# Patient Record
Sex: FEMALE | Race: WHITE | NOT HISPANIC OR LATINO
[De-identification: names, ages, dates, MRNs, and addresses within clinical notes are randomized per-mention and may not be internally consistent; named-entity substitution may affect disease eponyms.]

---

## 2017-04-03 ENCOUNTER — RESULT REVIEW (OUTPATIENT)
Age: 37
End: 2017-04-03

## 2017-04-03 DIAGNOSIS — Z33.2 ENCOUNTER FOR ELECTIVE TERMINATION OF PREGNANCY: ICD-10-CM

## 2017-12-10 ENCOUNTER — INPATIENT (INPATIENT)
Facility: HOSPITAL | Age: 37
LOS: 1 days | Discharge: HOME | End: 2017-12-12
Attending: OBSTETRICS & GYNECOLOGY | Admitting: OBSTETRICS & GYNECOLOGY
Payer: COMMERCIAL

## 2017-12-10 DIAGNOSIS — O26.899 OTHER SPECIFIED PREGNANCY RELATED CONDITIONS, UNSPECIFIED TRIMESTER: ICD-10-CM

## 2017-12-10 DIAGNOSIS — Z98.89 OTHER SPECIFIED POSTPROCEDURAL STATES: Chronic | ICD-10-CM

## 2017-12-10 DIAGNOSIS — Z3A.00 WEEKS OF GESTATION OF PREGNANCY NOT SPECIFIED: ICD-10-CM

## 2017-12-10 LAB
AMNISURE ROM (RUPTURE OF MEMBRANES): POSITIVE — SIGNIFICANT CHANGE UP
BASOPHILS # BLD AUTO: 0.05 K/UL — SIGNIFICANT CHANGE UP (ref 0–0.2)
BASOPHILS NFR BLD AUTO: 0.4 % — SIGNIFICANT CHANGE UP (ref 0–2)
BLD GP AB SCN SERPL QL: NEGATIVE — SIGNIFICANT CHANGE UP
EOSINOPHIL # BLD AUTO: 0.01 K/UL — SIGNIFICANT CHANGE UP (ref 0–0.5)
EOSINOPHIL NFR BLD AUTO: 0.1 % — SIGNIFICANT CHANGE UP (ref 0–6)
HCT VFR BLD CALC: 34.9 % — SIGNIFICANT CHANGE UP (ref 34.5–45)
HGB BLD-MCNC: 12 G/DL — SIGNIFICANT CHANGE UP (ref 11.5–15.5)
IMM GRANULOCYTES # BLD AUTO: 0.09 # — SIGNIFICANT CHANGE UP
IMM GRANULOCYTES NFR BLD AUTO: 0.7 % — SIGNIFICANT CHANGE UP (ref 0–1.5)
LYMPHOCYTES # BLD AUTO: 1.51 K/UL — SIGNIFICANT CHANGE UP (ref 1–3.3)
LYMPHOCYTES # BLD AUTO: 12.4 % — LOW (ref 13–44)
MCHC RBC-ENTMCNC: 31.8 PG — SIGNIFICANT CHANGE UP (ref 27–34)
MCHC RBC-ENTMCNC: 34.4 % — SIGNIFICANT CHANGE UP (ref 32–36)
MCV RBC AUTO: 92.6 FL — SIGNIFICANT CHANGE UP (ref 80–100)
MONOCYTES # BLD AUTO: 0.38 K/UL — SIGNIFICANT CHANGE UP (ref 0–0.9)
MONOCYTES NFR BLD AUTO: 3.1 % — SIGNIFICANT CHANGE UP (ref 2–14)
NEUTROPHILS # BLD AUTO: 10.16 K/UL — HIGH (ref 1.8–7.4)
NEUTROPHILS NFR BLD AUTO: 83.3 % — HIGH (ref 43–77)
NRBC # FLD: 0 — SIGNIFICANT CHANGE UP
PLATELET # BLD AUTO: 231 K/UL — SIGNIFICANT CHANGE UP (ref 150–400)
PMV BLD: 10.6 FL — SIGNIFICANT CHANGE UP (ref 7–13)
RBC # BLD: 3.77 M/UL — LOW (ref 3.8–5.2)
RBC # FLD: 13.7 % — SIGNIFICANT CHANGE UP (ref 10.3–14.5)
RH IG SCN BLD-IMP: POSITIVE — SIGNIFICANT CHANGE UP
WBC # BLD: 12.2 K/UL — HIGH (ref 3.8–10.5)
WBC # FLD AUTO: 12.2 K/UL — HIGH (ref 3.8–10.5)

## 2017-12-10 PROCEDURE — 99255 IP/OBS CONSLTJ NEW/EST HI 80: CPT | Mod: GC

## 2017-12-10 RX ORDER — LEVOTHYROXINE SODIUM 125 MCG
75 TABLET ORAL DAILY
Refills: 0 | Status: DISCONTINUED | OUTPATIENT
Start: 2017-12-10 | End: 2017-12-12

## 2017-12-10 RX ORDER — SODIUM CHLORIDE 9 MG/ML
1000 INJECTION, SOLUTION INTRAVENOUS
Refills: 0 | Status: DISCONTINUED | OUTPATIENT
Start: 2017-12-10 | End: 2017-12-12

## 2017-12-10 RX ORDER — ASPIRIN/CALCIUM CARB/MAGNESIUM 324 MG
81 TABLET ORAL DAILY
Refills: 0 | Status: DISCONTINUED | OUTPATIENT
Start: 2017-12-10 | End: 2017-12-10

## 2017-12-10 RX ORDER — MORPHINE SULFATE 50 MG/1
4 CAPSULE, EXTENDED RELEASE ORAL ONCE
Refills: 0 | Status: DISCONTINUED | OUTPATIENT
Start: 2017-12-10 | End: 2017-12-10

## 2017-12-10 RX ORDER — SODIUM CHLORIDE 9 MG/ML
3 INJECTION INTRAMUSCULAR; INTRAVENOUS; SUBCUTANEOUS EVERY 8 HOURS
Refills: 0 | Status: DISCONTINUED | OUTPATIENT
Start: 2017-12-10 | End: 2017-12-12

## 2017-12-10 RX ADMIN — MORPHINE SULFATE 4 MILLIGRAM(S): 50 CAPSULE, EXTENDED RELEASE ORAL at 14:25

## 2017-12-10 RX ADMIN — SODIUM CHLORIDE 125 MILLILITER(S): 9 INJECTION, SOLUTION INTRAVENOUS at 19:33

## 2017-12-11 VITALS — TEMPERATURE: 99 F | SYSTOLIC BLOOD PRESSURE: 127 MMHG | DIASTOLIC BLOOD PRESSURE: 68 MMHG

## 2017-12-11 LAB
APPEARANCE UR: CLEAR — SIGNIFICANT CHANGE UP
BILIRUB UR-MCNC: NEGATIVE — SIGNIFICANT CHANGE UP
BLOOD UR QL VISUAL: NEGATIVE — SIGNIFICANT CHANGE UP
COLOR SPEC: SIGNIFICANT CHANGE UP
GLUCOSE UR-MCNC: NEGATIVE — SIGNIFICANT CHANGE UP
KETONES UR-MCNC: NEGATIVE — SIGNIFICANT CHANGE UP
LEUKOCYTE ESTERASE UR-ACNC: NEGATIVE — SIGNIFICANT CHANGE UP
MUCOUS THREADS # UR AUTO: SIGNIFICANT CHANGE UP
NITRITE UR-MCNC: NEGATIVE — SIGNIFICANT CHANGE UP
PH UR: 7.5 — SIGNIFICANT CHANGE UP (ref 4.6–8)
PROT UR-MCNC: NEGATIVE MG/DL — SIGNIFICANT CHANGE UP
RBC CASTS # UR COMP ASSIST: SIGNIFICANT CHANGE UP (ref 0–?)
SP GR SPEC: 1.01 — SIGNIFICANT CHANGE UP (ref 1–1.04)
UROBILINOGEN FLD QL: NORMAL MG/DL — SIGNIFICANT CHANGE UP

## 2017-12-11 PROCEDURE — 59200 INSERT CERVICAL DILATOR: CPT | Mod: GC

## 2017-12-11 PROCEDURE — 99232 SBSQ HOSP IP/OBS MODERATE 35: CPT | Mod: 25,GC

## 2017-12-11 RX ORDER — MORPHINE SULFATE 50 MG/1
4 CAPSULE, EXTENDED RELEASE ORAL ONCE
Refills: 0 | Status: DISCONTINUED | OUTPATIENT
Start: 2017-12-11 | End: 2017-12-11

## 2017-12-11 RX ADMIN — Medication 75 MICROGRAM(S): at 07:51

## 2017-12-11 RX ADMIN — SODIUM CHLORIDE 3 MILLILITER(S): 9 INJECTION INTRAMUSCULAR; INTRAVENOUS; SUBCUTANEOUS at 05:58

## 2017-12-11 RX ADMIN — SODIUM CHLORIDE 3 MILLILITER(S): 9 INJECTION INTRAMUSCULAR; INTRAVENOUS; SUBCUTANEOUS at 00:34

## 2017-12-11 RX ADMIN — MORPHINE SULFATE 4 MILLIGRAM(S): 50 CAPSULE, EXTENDED RELEASE ORAL at 11:30

## 2017-12-11 RX ADMIN — MORPHINE SULFATE 4 MILLIGRAM(S): 50 CAPSULE, EXTENDED RELEASE ORAL at 07:35

## 2017-12-11 RX ADMIN — MORPHINE SULFATE 4 MILLIGRAM(S): 50 CAPSULE, EXTENDED RELEASE ORAL at 09:58

## 2017-12-12 ENCOUNTER — TRANSCRIPTION ENCOUNTER (OUTPATIENT)
Age: 37
End: 2017-12-12

## 2017-12-12 ENCOUNTER — RESULT REVIEW (OUTPATIENT)
Age: 37
End: 2017-12-12

## 2017-12-12 VITALS
HEART RATE: 77 BPM | OXYGEN SATURATION: 97 % | SYSTOLIC BLOOD PRESSURE: 103 MMHG | RESPIRATION RATE: 16 BRPM | DIASTOLIC BLOOD PRESSURE: 59 MMHG

## 2017-12-12 PROCEDURE — 59841 INDUCED ABORTION DILAT&EVAC: CPT | Mod: GC

## 2017-12-12 PROCEDURE — 88305 TISSUE EXAM BY PATHOLOGIST: CPT | Mod: 26

## 2017-12-12 RX ORDER — INFLUENZA VIRUS VACCINE 15; 15; 15; 15 UG/.5ML; UG/.5ML; UG/.5ML; UG/.5ML
0.5 SUSPENSION INTRAMUSCULAR ONCE
Refills: 0 | Status: DISCONTINUED | OUTPATIENT
Start: 2017-12-12 | End: 2017-12-12

## 2017-12-12 RX ORDER — FENTANYL CITRATE 50 UG/ML
50 INJECTION INTRAVENOUS
Refills: 0 | Status: DISCONTINUED | OUTPATIENT
Start: 2017-12-12 | End: 2017-12-12

## 2017-12-12 RX ORDER — HYDROMORPHONE HYDROCHLORIDE 2 MG/ML
1 INJECTION INTRAMUSCULAR; INTRAVENOUS; SUBCUTANEOUS
Refills: 0 | Status: DISCONTINUED | OUTPATIENT
Start: 2017-12-12 | End: 2017-12-12

## 2017-12-12 RX ORDER — ONDANSETRON 8 MG/1
4 TABLET, FILM COATED ORAL ONCE
Refills: 0 | Status: DISCONTINUED | OUTPATIENT
Start: 2017-12-12 | End: 2017-12-12

## 2017-12-12 RX ADMIN — Medication 75 MICROGRAM(S): at 06:43

## 2017-12-12 RX ADMIN — SODIUM CHLORIDE 3 MILLILITER(S): 9 INJECTION INTRAMUSCULAR; INTRAVENOUS; SUBCUTANEOUS at 06:44

## 2017-12-12 NOTE — DISCHARGE NOTE ADULT - CARE PLAN
Principal Discharge DX:	 premature rupture of membranes  Goal:	Return to baseline level of activity  Instructions for follow-up, activity and diet:	Regular diet as tolerated, regular activity as tolerated, no heavy lifting for first two weeks.  Nothing per vagina: no intercourse, tampons or douching.  Call your provider if you experience fevers, chills, worsening abdominal pain, inability to urinate or worsening vaginal bleeding.  Follow up with your provider in 2 weeks.

## 2017-12-12 NOTE — DISCHARGE NOTE ADULT - CARE PROVIDER_API CALL
Silvestre Dumont), Obstetrics and Gynecology  [address] [place] [address]  Phone: [phone]  Fax: [phone]

## 2017-12-12 NOTE — DISCHARGE NOTE ADULT - PATIENT PORTAL LINK FT
“You can access the Follow[facility] Patient Portal, offered by [facility], by registering with the following website: [email or web address]”

## 2017-12-12 NOTE — BRIEF OPERATIVE NOTE - PROCEDURE
<<-----Click on this checkbox to enter Procedure Ultrasound guidance  12/12/2017    Active  SHAHIDA  Dilation and evacuation of uterus using suction curettage  12/12/2017    Active  SHAHIDA

## 2017-12-12 NOTE — DISCHARGE NOTE ADULT - MEDICATION SUMMARY - MEDICATIONS TO TAKE
I will START or STAY ON the medications listed below when I get home from the hospital:  None I will START or STAY ON the medications listed below when I get home from the hospital:    methylergonovine 0.2 mg oral tablet  -- 1 tab(s) by mouth every 4 hours   -- It is very important that you take or use this exactly as directed.  Do not skip doses or discontinue unless directed by your doctor.    -- Indication: For bleeding

## 2017-12-13 ENCOUNTER — TRANSCRIPTION ENCOUNTER (OUTPATIENT)
Age: 37
End: 2017-12-13

## 2017-12-13 PROBLEM — Z33.2 LEGAL TERMINATION OF PREGNANCY: Status: ACTIVE | Noted: 2017-12-13

## 2017-12-13 RX ORDER — DOXYCYCLINE HYCLATE 100 MG/1
100 TABLET ORAL
Qty: 10 | Refills: 0 | Status: ACTIVE | COMMUNITY
Start: 2017-12-13 | End: 1900-01-01

## 2017-12-18 LAB — SURGICAL PATHOLOGY STUDY: SIGNIFICANT CHANGE UP

## 2018-02-20 NOTE — PRE-OP CHECKLIST - VIA
2/20/2018    Cady Marie PA-C  [address]  [place] [address]    RE: Elaina Winn       Dear Colleague,    I had the pleasure of seeing Elaina Winn in the [facility].    HPI and Plan:   See dictation #[id number]    Orders Placed This Encounter   Procedures     Follow-Up with Cardiac Advanced Practice Provider     EKG 12-lead complete w/read - Clinics (performed today)     EKG 12-lead complete w/read - Clinics (to be scheduled)       Orders Placed This Encounter   Medications     Apoaequorin (PREVAGEN PO)     Sig: Take 1 tablet by mouth daily       There are no discontinued medications.      Encounter Diagnosis   Name Primary?     Atrial fibrillation with RVR (H) Yes       CURRENT MEDICATIONS:  Current Outpatient Prescriptions   Medication Sig Dispense Refill     Apoaequorin (PREVAGEN PO) Take 1 tablet by mouth daily       metoprolol tartrate (LOPRESSOR) 25 MG tablet Take 1 tablet (25 mg) by mouth 2 times daily 60 tablet 3     apixaban ANTICOAGULANT (ELIQUIS) 5 MG tablet Take 1 tablet (5 mg) by mouth 2 times daily 180 tablet 3     diltiazem (DILTIAZEM CD) 240 MG 24 hr capsule Take 1 capsule (240 mg) by mouth daily 30 capsule      predniSONE (DELTASONE) 20 MG tablet Take 40 mg by mouth daily As directed       brimonidine-timolol (COMBIGAN) 0.2-0.5 % ophthalmic solution Place 1 drop into both eyes 2 times daily        Omega-3 Fatty Acids (FISH OIL PO) Take 1,000 mg by mouth daily        Multiple Vitamin (MULTI-VITAMIN) per tablet Take 1 tablet by mouth daily.         ALLERGIES     Allergies   Allergen Reactions     Adhesive Tape      Zocor [Simvastatin - High Dose]      Elevated LFTs       PAST MEDICAL HISTORY:  Past Medical History:   Diagnosis Date     Anxiety      Atrial fibrillation (H)      Chronic renal insufficiency      Dementia      Dry eyes, bilateral      Generalized OA      Glaucoma      HTN (hypertension), benign      Hyperlipidemia LDL goal < 130      Liver  "dysfunction      Paroxysmal a-fib (H)        PAST SURGICAL HISTORY:  Past Surgical History:   Procedure Laterality Date     DILATION AND CURETTAGE       HYSTERECTOMY      with USO, not for cancer     S/p partial vaginectomy       SEPTOPLASTY       TONSILLECTOMY & ADENOIDECTOMY         FAMILY HISTORY:  Family History   Problem Relation Age of Onset     Hypertension Mother       age 95     Alzheimer Disease Father 75     also CHF     CANCER Brother       of lung ca age 69     Lipids Brother      Alzheimer Disease Paternal Uncle      Alzheimer Disease Paternal Uncle        SOCIAL HISTORY:  Social History     Social History     Marital status:      Spouse name: N/A     Number of children: N/A     Years of education: N/A     Social History Main Topics     Smoking status: Former Smoker     Quit date: 1990     Smokeless tobacco: Never Used      Comment: quit      Alcohol use No     Drug use: No     Sexual activity: Not Currently     Partners: Male     Other Topics Concern     None     Social History Narrative    , 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  Walks 3miles per day        dtr (34) from [place] and lives in [facility]    Son from [place] and has mental health issues       Review of Systems:  Skin:  Negative       Eyes:  Positive for glasses    ENT:  Negative      Respiratory:  Positive for dyspnea on exertion     Cardiovascular:  Negative for;chest pain;edema Positive for;fatigue    Gastroenterology: Negative for melena;hematochezia    Genitourinary:  Negative      Musculoskeletal:  Positive for arthritis    Neurologic:  Positive for headaches occ  Psychiatric:  Positive for anxiety;depression    Heme/Lymph/Imm:  Positive for allergies    Endocrine:  Negative        Physical Exam:  Vitals: /74 (BP Location: Left arm, Patient Position: Chair, Cuff Size: Adult Regular)  Pulse 74  Ht 1.626 m (5' 4\")  Wt 64 kg (141 lb)  SpO2 95%  BMI 24.2 kg/m2    Constitutional:  cooperative, alert and " oriented, well developed, well nourished, in no acute distress        Skin:  warm and dry to the touch, no apparent skin lesions or masses noted          Head:  normocephalic, no masses or lesions        Eyes:  pupils equal and round;conjunctivae and lids unremarkable;sclera white;no xanthalasma        Lymph:      ENT:  no pallor or cyanosis, dentition good        Neck:  carotid pulses are full and equal bilaterally        Respiratory:  normal breath sounds, clear to auscultation, normal A-P diameter, normal symmetry, normal respiratory excursion, no use of accessory muscles         Cardiac: regular rhythm, normal S1/S2, no S3 or S4, apical impulse not displaced, no murmurs, gallops or rubs                                                         GI:  abdomen soft        Extremities and Muscular Skeletal:  no deformities, clubbing, cyanosis, erythema observed;no edema              Neurological:  no gross motor deficits        Psych:  Alert and Oriented x 3        CC  Cady Marie PA-C  [address]  [place] [address]                Thank you for allowing me to participate in the care of your patient.      Sincerely,     Nubia Turner PA-C     [facility]    cc:   Cady Marie PA-C  [address]  [place] [address]         stretcher

## 2018-03-27 ENCOUNTER — RESULT REVIEW (OUTPATIENT)
Age: 38
End: 2018-03-27

## 2018-08-15 ENCOUNTER — OUTPATIENT (OUTPATIENT)
Dept: OUTPATIENT SERVICES | Facility: HOSPITAL | Age: 38
LOS: 1 days | End: 2018-08-15

## 2018-08-15 VITALS
HEART RATE: 72 BPM | DIASTOLIC BLOOD PRESSURE: 64 MMHG | RESPIRATION RATE: 17 BRPM | OXYGEN SATURATION: 98 % | WEIGHT: 154.98 LBS | HEIGHT: 65 IN | TEMPERATURE: 97 F | SYSTOLIC BLOOD PRESSURE: 118 MMHG

## 2018-08-15 DIAGNOSIS — Z98.89 OTHER SPECIFIED POSTPROCEDURAL STATES: Chronic | ICD-10-CM

## 2018-08-15 DIAGNOSIS — O02.1 MISSED ABORTION: ICD-10-CM

## 2018-08-15 DIAGNOSIS — D68.61 ANTIPHOSPHOLIPID SYNDROME: ICD-10-CM

## 2018-08-15 LAB
BLD GP AB SCN SERPL QL: NEGATIVE — SIGNIFICANT CHANGE UP
HCT VFR BLD CALC: 38.1 % — SIGNIFICANT CHANGE UP (ref 34.5–45)
HGB BLD-MCNC: 12.9 G/DL — SIGNIFICANT CHANGE UP (ref 11.5–15.5)
MCHC RBC-ENTMCNC: 31 PG — SIGNIFICANT CHANGE UP (ref 27–34)
MCHC RBC-ENTMCNC: 33.9 % — SIGNIFICANT CHANGE UP (ref 32–36)
MCV RBC AUTO: 91.6 FL — SIGNIFICANT CHANGE UP (ref 80–100)
NRBC # FLD: 0 — SIGNIFICANT CHANGE UP
PLATELET # BLD AUTO: 258 K/UL — SIGNIFICANT CHANGE UP (ref 150–400)
PMV BLD: 10.2 FL — SIGNIFICANT CHANGE UP (ref 7–13)
RBC # BLD: 4.16 M/UL — SIGNIFICANT CHANGE UP (ref 3.8–5.2)
RBC # FLD: 12.2 % — SIGNIFICANT CHANGE UP (ref 10.3–14.5)
RH IG SCN BLD-IMP: POSITIVE — SIGNIFICANT CHANGE UP
WBC # BLD: 7.43 K/UL — SIGNIFICANT CHANGE UP (ref 3.8–10.5)
WBC # FLD AUTO: 7.43 K/UL — SIGNIFICANT CHANGE UP (ref 3.8–10.5)

## 2018-08-15 NOTE — H&P PST ADULT - PMH
Antiphospholipid antibody syndrome    Decreased sensation of lower extremity  R/T  Venous insufficiency  Hypothyroidism    Uterine fibroid    Uterine polyp    Venous insufficiency (chronic) (peripheral)

## 2018-08-15 NOTE — H&P PST ADULT - DENTITION
Pt denies any loose teeth or dentures. Pt. has an "inlay" lower left - has cracked with previous surgery.

## 2018-08-15 NOTE — H&P PST ADULT - NEGATIVE ENMT SYMPTOMS
no hearing difficulty/no ear pain/no tinnitus/no vertigo/no sinus symptoms/no nasal congestion/no dysphagia

## 2018-08-15 NOTE — H&P PST ADULT - NEUROLOGICAL SYMPTOMS
numbness and tinglining in bilateral legs/paresthesias numbness and tingling in bilateral legs/paresthesias

## 2018-08-15 NOTE — H&P PST ADULT - PROBLEM SELECTOR PLAN 1
Scheduled for dilation vacuum curettage on 08/20/18.  Pre-Op instructions provided to patient.  Pepcid for GI prophylaxis provided.

## 2018-08-15 NOTE — H&P PST ADULT - RS GEN PE MLT RESP DETAILS PC
airway patent/breath sounds equal/good air movement/respirations non-labored/clear to auscultation bilaterally/no wheezes

## 2018-08-15 NOTE — H&P PST ADULT - NSANTHOSAYNRD_GEN_A_CORE
No. KOFFI screening performed.  STOP BANG Legend: 0-2 = LOW Risk; 3-4 = INTERMEDIATE Risk; 5-8 = HIGH Risk

## 2018-08-15 NOTE — H&P PST ADULT - HISTORY OF PRESENT ILLNESS
38 year old female  9 weeks gestation presents to PST unit with missed  scheduled for dilation vacuum curettage on 2018. States she was having vaginal spotting with an absent fetal heartbeat at her last OB sono appointment. 38 year old female  at 9 weeks gestation presents to PST unit with missed  scheduled for dilation vacuum curettage on 2018. States she was having vaginal spotting with an absent fetal heartbeat at her last OB sono appointment.

## 2018-08-15 NOTE — H&P PST ADULT - FAMILY HISTORY
Father  Still living? Unknown  Family history of DVT, Age at diagnosis: Age Unknown  Hypertension, Age at diagnosis: Age Unknown     Mother  Still living? Unknown  Hypertension, Age at diagnosis: Age Unknown

## 2018-08-16 ENCOUNTER — RESULT REVIEW (OUTPATIENT)
Age: 38
End: 2018-08-16

## 2018-12-11 ENCOUNTER — RESULT REVIEW (OUTPATIENT)
Age: 38
End: 2018-12-11

## 2019-05-14 ENCOUNTER — APPOINTMENT (OUTPATIENT)
Dept: ANTEPARTUM | Facility: CLINIC | Age: 39
End: 2019-05-14
Payer: COMMERCIAL

## 2019-05-14 ENCOUNTER — ASOB RESULT (OUTPATIENT)
Age: 39
End: 2019-05-14

## 2019-05-14 PROCEDURE — 36416 COLLJ CAPILLARY BLOOD SPEC: CPT

## 2019-05-14 PROCEDURE — 76801 OB US < 14 WKS SINGLE FETUS: CPT

## 2019-05-14 PROCEDURE — 76813 OB US NUCHAL MEAS 1 GEST: CPT

## 2019-05-30 ENCOUNTER — APPOINTMENT (OUTPATIENT)
Dept: ANTEPARTUM | Facility: CLINIC | Age: 39
End: 2019-05-30
Payer: COMMERCIAL

## 2019-05-30 ENCOUNTER — ASOB RESULT (OUTPATIENT)
Age: 39
End: 2019-05-30

## 2019-05-30 PROCEDURE — 76815 OB US LIMITED FETUS(S): CPT

## 2019-05-30 PROCEDURE — 76819 FETAL BIOPHYS PROFIL W/O NST: CPT

## 2019-06-13 ENCOUNTER — APPOINTMENT (OUTPATIENT)
Dept: ANTEPARTUM | Facility: CLINIC | Age: 39
End: 2019-06-13
Payer: COMMERCIAL

## 2019-06-13 ENCOUNTER — ASOB RESULT (OUTPATIENT)
Age: 39
End: 2019-06-13

## 2019-06-13 PROCEDURE — 76817 TRANSVAGINAL US OBSTETRIC: CPT

## 2019-06-13 PROCEDURE — 76815 OB US LIMITED FETUS(S): CPT

## 2019-07-09 ENCOUNTER — APPOINTMENT (OUTPATIENT)
Dept: ANTEPARTUM | Facility: CLINIC | Age: 39
End: 2019-07-09
Payer: COMMERCIAL

## 2019-07-09 ENCOUNTER — ASOB RESULT (OUTPATIENT)
Age: 39
End: 2019-07-09

## 2019-07-09 PROCEDURE — 76811 OB US DETAILED SNGL FETUS: CPT

## 2019-07-09 PROCEDURE — 76817 TRANSVAGINAL US OBSTETRIC: CPT

## 2019-07-25 ENCOUNTER — ASOB RESULT (OUTPATIENT)
Age: 39
End: 2019-07-25

## 2019-07-25 ENCOUNTER — APPOINTMENT (OUTPATIENT)
Dept: ANTEPARTUM | Facility: CLINIC | Age: 39
End: 2019-07-25
Payer: COMMERCIAL

## 2019-07-25 PROCEDURE — 76817 TRANSVAGINAL US OBSTETRIC: CPT

## 2019-07-25 PROCEDURE — 76815 OB US LIMITED FETUS(S): CPT

## 2019-08-08 ENCOUNTER — ASOB RESULT (OUTPATIENT)
Age: 39
End: 2019-08-08

## 2019-08-08 ENCOUNTER — APPOINTMENT (OUTPATIENT)
Dept: ANTEPARTUM | Facility: CLINIC | Age: 39
End: 2019-08-08
Payer: COMMERCIAL

## 2019-08-08 PROCEDURE — 76816 OB US FOLLOW-UP PER FETUS: CPT

## 2019-09-05 ENCOUNTER — APPOINTMENT (OUTPATIENT)
Dept: ANTEPARTUM | Facility: CLINIC | Age: 39
End: 2019-09-05
Payer: COMMERCIAL

## 2019-09-05 ENCOUNTER — ASOB RESULT (OUTPATIENT)
Age: 39
End: 2019-09-05

## 2019-09-05 PROCEDURE — 76819 FETAL BIOPHYS PROFIL W/O NST: CPT

## 2019-09-05 PROCEDURE — 76816 OB US FOLLOW-UP PER FETUS: CPT

## 2019-09-20 NOTE — DISCHARGE NOTE ADULT - HOSPITAL COURSE
0 = independent Pt admitted with PPROM @ 19 wks. Underwent uncomplicated D+E. .    At time of discharge patient pain well controlled, tolerating regular diet, ambulating and vital signs stable. Patient will have close interval follow up with Dr. Dumont.

## 2019-10-01 ENCOUNTER — ASOB RESULT (OUTPATIENT)
Age: 39
End: 2019-10-01

## 2019-10-01 ENCOUNTER — APPOINTMENT (OUTPATIENT)
Dept: ANTEPARTUM | Facility: CLINIC | Age: 39
End: 2019-10-01
Payer: COMMERCIAL

## 2019-10-01 PROCEDURE — 76819 FETAL BIOPHYS PROFIL W/O NST: CPT

## 2019-10-01 PROCEDURE — 76816 OB US FOLLOW-UP PER FETUS: CPT

## 2019-10-29 ENCOUNTER — APPOINTMENT (OUTPATIENT)
Dept: ANTEPARTUM | Facility: HOSPITAL | Age: 39
End: 2019-10-29

## 2019-10-29 ENCOUNTER — ASOB RESULT (OUTPATIENT)
Age: 39
End: 2019-10-29

## 2019-10-29 ENCOUNTER — OUTPATIENT (OUTPATIENT)
Dept: OUTPATIENT SERVICES | Facility: HOSPITAL | Age: 39
LOS: 1 days | End: 2019-10-29

## 2019-10-29 ENCOUNTER — APPOINTMENT (OUTPATIENT)
Dept: ANTEPARTUM | Facility: CLINIC | Age: 39
End: 2019-10-29
Payer: COMMERCIAL

## 2019-10-29 DIAGNOSIS — Z98.89 OTHER SPECIFIED POSTPROCEDURAL STATES: Chronic | ICD-10-CM

## 2019-10-29 PROCEDURE — 76816 OB US FOLLOW-UP PER FETUS: CPT

## 2019-10-29 PROCEDURE — 76818 FETAL BIOPHYS PROFILE W/NST: CPT | Mod: 26

## 2019-11-01 ENCOUNTER — OUTPATIENT (OUTPATIENT)
Dept: OUTPATIENT SERVICES | Facility: HOSPITAL | Age: 39
LOS: 1 days | End: 2019-11-01

## 2019-11-01 VITALS
HEIGHT: 65 IN | OXYGEN SATURATION: 98 % | TEMPERATURE: 97 F | DIASTOLIC BLOOD PRESSURE: 70 MMHG | HEART RATE: 98 BPM | WEIGHT: 179.9 LBS | SYSTOLIC BLOOD PRESSURE: 110 MMHG | RESPIRATION RATE: 16 BRPM

## 2019-11-01 DIAGNOSIS — Z01.818 ENCOUNTER FOR OTHER PREPROCEDURAL EXAMINATION: ICD-10-CM

## 2019-11-01 DIAGNOSIS — D68.61 ANTIPHOSPHOLIPID SYNDROME: ICD-10-CM

## 2019-11-01 DIAGNOSIS — O09.299 SUPERVISION OF PREGNANCY WITH OTHER POOR REPRODUCTIVE OR OBSTETRIC HISTORY, UNSPECIFIED TRIMESTER: Chronic | ICD-10-CM

## 2019-11-01 DIAGNOSIS — Z98.891 HISTORY OF UTERINE SCAR FROM PREVIOUS SURGERY: Chronic | ICD-10-CM

## 2019-11-01 DIAGNOSIS — Z98.89 OTHER SPECIFIED POSTPROCEDURAL STATES: Chronic | ICD-10-CM

## 2019-11-01 DIAGNOSIS — O34.219 MATERNAL CARE FOR UNSPECIFIED TYPE SCAR FROM PREVIOUS CESAREAN DELIVERY: ICD-10-CM

## 2019-11-01 LAB
APPEARANCE UR: SIGNIFICANT CHANGE UP
BACTERIA # UR AUTO: HIGH
BILIRUB UR-MCNC: NEGATIVE — SIGNIFICANT CHANGE UP
BLD GP AB SCN SERPL QL: NEGATIVE — SIGNIFICANT CHANGE UP
BLOOD UR QL VISUAL: NEGATIVE — SIGNIFICANT CHANGE UP
COLOR SPEC: SIGNIFICANT CHANGE UP
GLUCOSE UR-MCNC: NEGATIVE — SIGNIFICANT CHANGE UP
HCT VFR BLD CALC: 36.7 % — SIGNIFICANT CHANGE UP (ref 34.5–45)
HGB BLD-MCNC: 11.5 G/DL — SIGNIFICANT CHANGE UP (ref 11.5–15.5)
KETONES UR-MCNC: NEGATIVE — SIGNIFICANT CHANGE UP
LEUKOCYTE ESTERASE UR-ACNC: SIGNIFICANT CHANGE UP
MCHC RBC-ENTMCNC: 30.1 PG — SIGNIFICANT CHANGE UP (ref 27–34)
MCHC RBC-ENTMCNC: 31.3 % — LOW (ref 32–36)
MCV RBC AUTO: 96.1 FL — SIGNIFICANT CHANGE UP (ref 80–100)
NITRITE UR-MCNC: NEGATIVE — SIGNIFICANT CHANGE UP
NRBC # FLD: 0 K/UL — SIGNIFICANT CHANGE UP (ref 0–0)
PH UR: 7.5 — SIGNIFICANT CHANGE UP (ref 5–8)
PLATELET # BLD AUTO: 167 K/UL — SIGNIFICANT CHANGE UP (ref 150–400)
PMV BLD: 11 FL — SIGNIFICANT CHANGE UP (ref 7–13)
PROT UR-MCNC: NEGATIVE — SIGNIFICANT CHANGE UP
RBC # BLD: 3.82 M/UL — SIGNIFICANT CHANGE UP (ref 3.8–5.2)
RBC # FLD: 13.8 % — SIGNIFICANT CHANGE UP (ref 10.3–14.5)
RBC CASTS # UR COMP ASSIST: SIGNIFICANT CHANGE UP (ref 0–?)
RH IG SCN BLD-IMP: POSITIVE — SIGNIFICANT CHANGE UP
SP GR SPEC: 1.01 — SIGNIFICANT CHANGE UP (ref 1–1.04)
SQUAMOUS # UR AUTO: SIGNIFICANT CHANGE UP
UROBILINOGEN FLD QL: NORMAL — SIGNIFICANT CHANGE UP
WBC # BLD: 11.44 K/UL — HIGH (ref 3.8–10.5)
WBC # FLD AUTO: 11.44 K/UL — HIGH (ref 3.8–10.5)
WBC UR QL: HIGH (ref 0–?)

## 2019-11-01 NOTE — OB PST NOTE - NSHPREVIEWOFSYSTEMS_GEN_ALL_CORE
General: Gained 20 lbs during this pregnancy. No fever, chills, sweating, anorexia.     Skin: No rashes, itching, or dryness.     Breast: No tenderness, lumps, or nipple discharge      Ophthalmologic: No diplopia, photophobia, blurred Vision , or eye discharge    ENMT Symptoms: No hearing difficulty, ear pain, tinnitus, or vertigo. No sinus symptoms, nasal congestion, nasal   discharge, or nasal obstruction    Respiratory and Thorax: No wheezing, dyspnea, cough.    Cardiovascular: No chest pain, palpitations, dyspnea on exertion, peripheral edema, or claudication    Gastrointestinal: "c/o constipation & diarrhea during this pregnancy, still nauseous" No vomiting, change in bowel habits, abdominal pain, or melena    Genitourinary/ Pelvis: c/o vaginal discharge, vaginal spotting 2 days last week - evaluated by Dr. Dumont. No hematuria, renal colic, or flank pain.  No urine discoloration, incontinence, dysuria, or urinary hesitancy. Normal urinary frequency. No nocturia, abnormal vaginal bleeding, pelvic pain, or vaginal leakage    Musculoskeletal: c/o bilateral hip pain r/t pregnancy. No arthritis, joint swelling, muscle cramping, muscle weakness, neck pain, arm pain, or leg pain    Neurological: No weakness, paresthesias, or seizures. No syncope, tremors, vertigo, loss of sensation, difficulty walking, loss of consciousness    Psychiatric: No suicidal ideation, depression, anxiety    Hematology: No gum bleeding, nose bleeding, or skin lumps    Lymphatic: No enlarged or tender lymph nodes. No extremity swelling    Endocrine: hypothyroidism - last endocrinologist evaluation during second trimester. TFT's monitor by endocrinologist. no heat or cold intolerance.     Immunologic: No recurrent or persistent infections

## 2019-11-01 NOTE — OB PST NOTE - HISTORY OF PRESENT ILLNESS
40 yo female  miscarriage x 2 (at 19 weeks s/p D& E 2017, at 9.5 weeks s/p D & C 2018) LMP 2019 [place] 2019 presents to have PST evaluation for repeat  section on 2019. Patient reports, tubal ligation also planned with  section & consent signed. Patient reports, positive fetal movement while at [facility]. Patient reports, hx of  antiphospholipid antibody syndrome, f/u with hematologist every month (last eval in 10/2019), on daily baby aspirin, heparin SQ. Patient c/o vaginal spotting for 2 days last week, had an evaluation with Dr. Cloud. 38 yo female  miscarriage x 2 (at 19 weeks s/p D& E 2017, at 9.5 weeks s/p D & C 2018) LMP 2019 ROC[place]LE 2019 presents to have PST evaluation for repeat  section on 2019. Patient reports, tubal ligation is also planned.  Patient reports, positive fetal movement while at [facility]. Patient reports, hx of  antiphospholipid antibody syndrome, f/u with hematologist every month (last eval in 10/2019), on daily baby aspirin, heparin SQ. Patient c/o vaginal spotting for 2 days last week, had an evaluation with Dr. Cloud. 40 yo female  miscarriage x 2 (at 19 weeks s/p D& E 2017, at 9.5 weeks s/p D & C 2018) LMP 2019 ROCHELLE 2019 36 weeks 6 days gestation IUP presents to have [facility] evaluation for repeat  section on 2019. Patient reports, tubal ligation is also planned.  Patient reports, positive fetal movement while at [facility]. Patient reports, hx of  antiphospholipid antibody syndrome, f/u with hematologist every month (last eval in 10/2019), on daily baby aspirin, heparin SQ. Patient c/o vaginal spotting for 2 days last week, had an evaluation with Dr. Cloud.

## 2019-11-01 NOTE — OB PST NOTE - PROBLEM SELECTOR PLAN 1
Scheduled for repeat  section on 2019. labs done and results pending.  Verbal and written preop instruction given and explained. Patient verbalized understanding. Chlorhexidine antiseptic solution with written and verbal instruction given & Patient verbalized understanding with return demonstration. Scheduled for repeat  section on 2019. Patient reports, she's also planning to have tubal ligation - office was called to confirm. labs done and results pending.  Verbal and written preop instruction given and explained. Patient verbalized understanding. Chlorhexidine antiseptic solution with written and verbal instruction given & Patient verbalized understanding with return demonstration.

## 2019-11-01 NOTE — OB PST NOTE - PMH
Antiphospholipid antibody syndrome  last hematologist evaluaton (Dr. Denton) in 10/2019  Decreased sensation of lower extremity  R/T  Venous insufficiency last vascular evaluation Dr. Amaral 2016  Hypothyroidism  last endo evaluation Dr. RIOS in second trimester  Uterine fibroid    Uterine polyp    Venous insufficiency (chronic) (peripheral) Antiphospholipid antibody syndrome  last hematologist evaluation (Dr. Denton) in 10/2019  Decreased sensation of lower extremity  R/T  Venous insufficiency last vascular evaluation Dr. Amaral 2016  Hypothyroidism  last endo evaluation Dr. RIOS in second trimester  Uterine fibroid    Uterine polyp    Venous insufficiency (chronic) (peripheral)

## 2019-11-01 NOTE — OB PST NOTE - VISION (WITH CORRECTIVE LENSES IF THE PATIENT USUALLY WEARS THEM):
wear corrective lenses/Partially impaired: cannot see medication labels or newsprint, but can see obstacles in path, and the surrounding layout; can count fingers at arm's length

## 2019-11-01 NOTE — OB PST NOTE - PROBLEM SELECTOR PLAN 2
hx of antiphospholipid antibody syndrome, on heparin SQ & aspirin. last hematology evaluation in 10/2019 & next appt on 11/4/2019. Will obtain last hematologist note. Heparin & aspirin instruction as per Dr. Dumont. Pt. verbalized understanding.

## 2019-11-01 NOTE — OB PST NOTE - NSHPPHYSICALEXAM_GEN_ALL_CORE
Constitutional: Well Developed, Well Groomed, Well Nourished, No Distress    Eyes: PERRL, EOMI, conjunctiva clear    Ears: Normal    Mouth & Gums: Normal, moist    Pharynx: No discharge    Tonsils: No Redness, discharge, or swelling    Neck: Supple, no JVD, normal thyroid glands, ROM intact    Breast: Normal shape, no masses, no tenderness, nipples normal, no nipple discharge    Back: Normal shape, ROM intact, strength intact    Respiratory: Airway patent, breath sounds equal, good air movement, respiration non-labored, clear to auscultation bilateral, no rales, no wheezes, no rhonchi, no subcutaneous emphysema    Cardiovascular:  Regular rate and rhythm, no rubs or murmur    Gastrointestinal: Soft, non-tender, non distention, no masses palpable, bowel sound normal    Extremities: + 2 edema/ wearing compression stocking No clubbing, cyanosis    Vascular: Radial Pulse normal    Neurological: alert & oriented x 3, sensation intact, normal strength    Skin: warm and dry, normal color    Lymph Nodes: normal posterior cervical lymph node, normal anterior cervical lymph node, normal supraclavicular lymph node     Musculoskeletal: ROM intact, no joint swelling, warmth, or calf tenderness. Normal strength    Psychiatric: normal affect, normal behavior

## 2019-11-01 NOTE — OB PST NOTE - PSH
H/O benign breast biopsy    H/O dilation and curettage  x3 in , x1 in   H/O miscarriage, currently pregnant  s/p D & E at 19 weeks, s/p D & C at 9.5 weeks  H/O:  section  2016

## 2019-11-01 NOTE — OB PST NOTE - FAMILY HISTORY
Father  Still living? Yes, Estimated age: Age Unknown  Family history of DVT, Age at diagnosis: Age Unknown  Hypertension, Age at diagnosis: Age Unknown     Mother  Still living? Unknown  Hypertension, Age at diagnosis: Age Unknown

## 2019-11-02 LAB — T PALLIDUM AB TITR SER: NEGATIVE — SIGNIFICANT CHANGE UP

## 2019-11-05 ENCOUNTER — ASOB RESULT (OUTPATIENT)
Age: 39
End: 2019-11-05

## 2019-11-05 ENCOUNTER — OUTPATIENT (OUTPATIENT)
Dept: OUTPATIENT SERVICES | Facility: HOSPITAL | Age: 39
LOS: 1 days | End: 2019-11-05

## 2019-11-05 ENCOUNTER — APPOINTMENT (OUTPATIENT)
Dept: ANTEPARTUM | Facility: CLINIC | Age: 39
End: 2019-11-05
Payer: COMMERCIAL

## 2019-11-05 ENCOUNTER — APPOINTMENT (OUTPATIENT)
Dept: ANTEPARTUM | Facility: HOSPITAL | Age: 39
End: 2019-11-05

## 2019-11-05 ENCOUNTER — TRANSCRIPTION ENCOUNTER (OUTPATIENT)
Age: 39
End: 2019-11-05

## 2019-11-05 VITALS — HEART RATE: 85 BPM | DIASTOLIC BLOOD PRESSURE: 78 MMHG | SYSTOLIC BLOOD PRESSURE: 135 MMHG

## 2019-11-05 VITALS
DIASTOLIC BLOOD PRESSURE: 83 MMHG | SYSTOLIC BLOOD PRESSURE: 132 MMHG | RESPIRATION RATE: 16 BRPM | TEMPERATURE: 98 F | HEART RATE: 91 BPM

## 2019-11-05 DIAGNOSIS — Z98.891 HISTORY OF UTERINE SCAR FROM PREVIOUS SURGERY: Chronic | ICD-10-CM

## 2019-11-05 DIAGNOSIS — O26.899 OTHER SPECIFIED PREGNANCY RELATED CONDITIONS, UNSPECIFIED TRIMESTER: ICD-10-CM

## 2019-11-05 DIAGNOSIS — Z98.89 OTHER SPECIFIED POSTPROCEDURAL STATES: Chronic | ICD-10-CM

## 2019-11-05 DIAGNOSIS — O09.299 SUPERVISION OF PREGNANCY WITH OTHER POOR REPRODUCTIVE OR OBSTETRIC HISTORY, UNSPECIFIED TRIMESTER: Chronic | ICD-10-CM

## 2019-11-05 DIAGNOSIS — Z3A.00 WEEKS OF GESTATION OF PREGNANCY NOT SPECIFIED: ICD-10-CM

## 2019-11-05 PROCEDURE — 76818 FETAL BIOPHYS PROFILE W/NST: CPT | Mod: 26

## 2019-11-05 PROCEDURE — 76820 UMBILICAL ARTERY ECHO: CPT

## 2019-11-05 NOTE — OB PROVIDER TRIAGE NOTE - ADDITIONAL INSTRUCTIONS
Return in AM on 11/6/19 for Repeat C/S in AM @ 0630 AM  DC as per MFM Dr Kendall and DR Dumont (OB Attending)  Discharge instructions were personally given by Dr Dumont at the bedside  Patient aware and verbalized understanding

## 2019-11-05 NOTE — OB PROVIDER TRIAGE NOTE - PLAN OF CARE
BPP with a 2 x 2 MVP  NST reviewed by MFM Attending Dr Fleischer and Dr Dumont (OB Attending)  Return in AM for repeat C/S  Fetal kick counts and precautions given Return in AM for repeat C/S  Fetal kick counts  labor precautions

## 2019-11-05 NOTE — OB RN TRIAGE NOTE - CHIEF COMPLAINT QUOTE
sent from atu for decels decreased carlitos  r/o srom sent from atu for decels decreased carlitos  r/o srom  scheduled c/s 11/18

## 2019-11-05 NOTE — OB RN TRIAGE NOTE - PMH
Antiphospholipid antibody syndrome  last hematologist evaluaton (Dr. Denton) in 10/2019  Decreased sensation of lower extremity  R/T  Venous insufficiency last vascular evaluation Dr. Amaral 2016  Hypothyroidism  last endo evaluation Dr. RIOS in second trimester  Uterine fibroid    Uterine polyp    Venous insufficiency (chronic) (peripheral)

## 2019-11-05 NOTE — OB PROVIDER TRIAGE NOTE - NSHPLABSRESULTS_GEN_ALL_CORE
Complete Blood Count (11.01.19 @ 10:00)    WBC Count: 11.44 K/uL    RBC Count: 3.82 M/uL    Hemoglobin: 11.5 g/dL    Hematocrit: 36.7 %    Mean Cell Volume: 96.1 fL    Mean Cell Hemoglobin: 30.1 pg    Mean Cell Hemoglobin Conc: 31.3 %    Red Cell Distrib Width: 13.8 %    Platelet Count - Automated: 167 K/uL    MPV: 11.0 fl    Nucleated RBC #: 0 K/uL    PAST Labs    NST reviewed by MFM Fellow ( Dr Walsh)   Dr Dumont (OB attending) and DR Kendall (MFM Attending)

## 2019-11-05 NOTE — OB PROVIDER TRIAGE NOTE - NSOBPROVIDERNOTE_OBGYN_ALL_OB_FT
38 yo @ 37.3 wks GA sent from ATU for low JASON @ 4.9 and variables on NST for prolonged monitoring  -NST approved by Dr Kendall  (MMF Attending) and Dr Dumont (Ob Attending)  - To Return for C/S in AM -Dr Dumont (OB attending) personally spoke with patient at the bedside  -

## 2019-11-05 NOTE — OB PROVIDER TRIAGE NOTE - HISTORY OF PRESENT ILLNESS
40 yo @ 37.3 wks GA sent from ATU for low JASON @ 4.9 and variables on NST for prolonged monitoring  - PNC:  Dr Dumont  Pt scheduled for a repeat C/S on 11/18/19  Poor OB HX DE at 20 wks GA, 2 MAB's with DC's  Antiphospholipid syndrome and Venous insufficiency  on ASA and Heparin 5000 BID  AMA  Patient offers c/o decreased FM's today although she is feeling movement, Denies any LOF, VB, CTX,   Followed in ATU

## 2019-11-05 NOTE — OB PROVIDER TRIAGE NOTE - NS_OBGYNHISTORY_OBGYN_ALL_OB_FT
H/O benign breast biopsy    H/O dilation and curettage  x3 in , x1 in   H/O miscarriage, currently pregnant  s/p D & E at 19 weeks, s/p D & C at 9.5 weeks  H/O:  section      Antiphospholipid antibody syndrome  last hematologist evaluaton (Dr. Denton) in 10/2019  Decreased sensation of lower extremity  R/T  Venous insufficiency last vascular evaluation Dr. Amaral   Hypothyroidism  last endo evaluation Dr. RIOS in second trimester  Uterine fibroid    Uterine polyp    Venous insufficiency (chronic) (peripheral

## 2019-11-05 NOTE — OB PROVIDER TRIAGE NOTE - NSHPPHYSICALEXAM_GEN_ALL_CORE
A/O x 3  NAD  ICU Vital Signs Last 24 Hrs  T(C): 36.8 (05 Nov 2019 11:00), Max: 36.8 (05 Nov 2019 10:48)  T(F): 98.24 (05 Nov 2019 11:00), Max: 98.24 (05 Nov 2019 11:00)  HR: 85 (05 Nov 2019 14:45) (81 - 104)  BP: 135/78 (05 Nov 2019 14:45) (106/60 - 135/78)  BP(mean): --  ABP: --  ABP(mean): --  RR: 16 (05 Nov 2019 10:48) (16 - 16)  SpO2: 97% (05 Nov 2019 14:31) (96% - 99%)  Abdomen is soft nt and gravid with no ctx palpable  NST in progress

## 2019-11-05 NOTE — CHART NOTE - NSCHARTNOTEFT_GEN_A_CORE
Att MFM  Pat seen for JASON=4.9 (Single poccket >2/2cm)  AGA fetus;  ROM was ruled out  FHR Reactive good variability occ mild variable decels  Currently on Heparin prophylaxis  Plan Delivery in the AM by rpt C/S

## 2019-11-06 ENCOUNTER — TRANSCRIPTION ENCOUNTER (OUTPATIENT)
Age: 39
End: 2019-11-06

## 2019-11-06 ENCOUNTER — INPATIENT (INPATIENT)
Facility: HOSPITAL | Age: 39
LOS: 2 days | Discharge: HOME | End: 2019-11-09
Attending: OBSTETRICS & GYNECOLOGY | Admitting: OBSTETRICS & GYNECOLOGY
Payer: COMMERCIAL

## 2019-11-06 ENCOUNTER — RESULT REVIEW (OUTPATIENT)
Age: 39
End: 2019-11-06

## 2019-11-06 VITALS — TEMPERATURE: 98 F

## 2019-11-06 DIAGNOSIS — O09.299 SUPERVISION OF PREGNANCY WITH OTHER POOR REPRODUCTIVE OR OBSTETRIC HISTORY, UNSPECIFIED TRIMESTER: Chronic | ICD-10-CM

## 2019-11-06 DIAGNOSIS — Z98.89 OTHER SPECIFIED POSTPROCEDURAL STATES: Chronic | ICD-10-CM

## 2019-11-06 DIAGNOSIS — Z98.891 HISTORY OF UTERINE SCAR FROM PREVIOUS SURGERY: Chronic | ICD-10-CM

## 2019-11-06 DIAGNOSIS — O34.219 MATERNAL CARE FOR UNSPECIFIED TYPE SCAR FROM PREVIOUS CESAREAN DELIVERY: ICD-10-CM

## 2019-11-06 DIAGNOSIS — Z98.891 HISTORY OF UTERINE SCAR FROM PREVIOUS SURGERY: ICD-10-CM

## 2019-11-06 LAB
APTT BLD: 27.4 SEC — LOW (ref 27.5–36.3)
BASOPHILS # BLD AUTO: 0.06 K/UL — SIGNIFICANT CHANGE UP (ref 0–0.2)
BASOPHILS NFR BLD AUTO: 0.6 % — SIGNIFICANT CHANGE UP (ref 0–2)
BLD GP AB SCN SERPL QL: NEGATIVE — SIGNIFICANT CHANGE UP
EOSINOPHIL # BLD AUTO: 0.03 K/UL — SIGNIFICANT CHANGE UP (ref 0–0.5)
EOSINOPHIL NFR BLD AUTO: 0.3 % — SIGNIFICANT CHANGE UP (ref 0–6)
FIBRINOGEN PPP-MCNC: > 826 MG/DL — HIGH (ref 350–510)
HCT VFR BLD CALC: 36.2 % — SIGNIFICANT CHANGE UP (ref 34.5–45)
HGB BLD-MCNC: 12.3 G/DL — SIGNIFICANT CHANGE UP (ref 11.5–15.5)
IMM GRANULOCYTES NFR BLD AUTO: 1.5 % — SIGNIFICANT CHANGE UP (ref 0–1.5)
INR BLD: 0.97 — SIGNIFICANT CHANGE UP (ref 0.88–1.17)
LYMPHOCYTES # BLD AUTO: 1.79 K/UL — SIGNIFICANT CHANGE UP (ref 1–3.3)
LYMPHOCYTES # BLD AUTO: 16.6 % — SIGNIFICANT CHANGE UP (ref 13–44)
MCHC RBC-ENTMCNC: 31.1 PG — SIGNIFICANT CHANGE UP (ref 27–34)
MCHC RBC-ENTMCNC: 34 % — SIGNIFICANT CHANGE UP (ref 32–36)
MCV RBC AUTO: 91.4 FL — SIGNIFICANT CHANGE UP (ref 80–100)
MONOCYTES # BLD AUTO: 0.61 K/UL — SIGNIFICANT CHANGE UP (ref 0–0.9)
MONOCYTES NFR BLD AUTO: 5.7 % — SIGNIFICANT CHANGE UP (ref 2–14)
NEUTROPHILS # BLD AUTO: 8.13 K/UL — HIGH (ref 1.8–7.4)
NEUTROPHILS NFR BLD AUTO: 75.3 % — SIGNIFICANT CHANGE UP (ref 43–77)
NRBC # FLD: 0 K/UL — SIGNIFICANT CHANGE UP (ref 0–0)
PLATELET # BLD AUTO: 166 K/UL — SIGNIFICANT CHANGE UP (ref 150–400)
PMV BLD: 10.6 FL — SIGNIFICANT CHANGE UP (ref 7–13)
PROTHROM AB SERPL-ACNC: 11 SEC — SIGNIFICANT CHANGE UP (ref 9.8–13.1)
RBC # BLD: 3.96 M/UL — SIGNIFICANT CHANGE UP (ref 3.8–5.2)
RBC # FLD: 13.5 % — SIGNIFICANT CHANGE UP (ref 10.3–14.5)
RH IG SCN BLD-IMP: POSITIVE — SIGNIFICANT CHANGE UP
WBC # BLD: 10.78 K/UL — HIGH (ref 3.8–10.5)
WBC # FLD AUTO: 10.78 K/UL — HIGH (ref 3.8–10.5)

## 2019-11-06 PROCEDURE — 88302 TISSUE EXAM BY PATHOLOGIST: CPT | Mod: 26

## 2019-11-06 PROCEDURE — 59514 CESAREAN DELIVERY ONLY: CPT | Mod: AS,U8

## 2019-11-06 RX ORDER — KETOROLAC TROMETHAMINE 30 MG/ML
30 SYRINGE (ML) INJECTION EVERY 6 HOURS
Refills: 0 | Status: DISCONTINUED | OUTPATIENT
Start: 2019-11-06 | End: 2019-11-07

## 2019-11-06 RX ORDER — OXYTOCIN 10 UNIT/ML
41.67 VIAL (ML) INJECTION
Qty: 20 | Refills: 0 | Status: DISCONTINUED | OUTPATIENT
Start: 2019-11-06 | End: 2019-11-06

## 2019-11-06 RX ORDER — METOCLOPRAMIDE HCL 10 MG
10 TABLET ORAL ONCE
Refills: 0 | Status: DISCONTINUED | OUTPATIENT
Start: 2019-11-06 | End: 2019-11-06

## 2019-11-06 RX ORDER — CITRIC ACID/SODIUM CITRATE 300-500 MG
30 SOLUTION, ORAL ORAL ONCE
Refills: 0 | Status: DISCONTINUED | OUTPATIENT
Start: 2019-11-06 | End: 2019-11-06

## 2019-11-06 RX ORDER — OXYCODONE HYDROCHLORIDE 5 MG/1
5 TABLET ORAL
Refills: 0 | Status: DISCONTINUED | OUTPATIENT
Start: 2019-11-06 | End: 2019-11-06

## 2019-11-06 RX ORDER — SODIUM CHLORIDE 9 MG/ML
1000 INJECTION, SOLUTION INTRAVENOUS
Refills: 0 | Status: DISCONTINUED | OUTPATIENT
Start: 2019-11-06 | End: 2019-11-06

## 2019-11-06 RX ORDER — IBUPROFEN 200 MG
600 TABLET ORAL EVERY 6 HOURS
Refills: 0 | Status: COMPLETED | OUTPATIENT
Start: 2019-11-06 | End: 2020-10-04

## 2019-11-06 RX ORDER — OXYCODONE HYDROCHLORIDE 5 MG/1
5 TABLET ORAL ONCE
Refills: 0 | Status: DISCONTINUED | OUTPATIENT
Start: 2019-11-06 | End: 2019-11-09

## 2019-11-06 RX ORDER — OXYCODONE HYDROCHLORIDE 5 MG/1
5 TABLET ORAL
Refills: 0 | Status: DISCONTINUED | OUTPATIENT
Start: 2019-11-06 | End: 2019-11-09

## 2019-11-06 RX ORDER — SIMETHICONE 80 MG/1
80 TABLET, CHEWABLE ORAL EVERY 4 HOURS
Refills: 0 | Status: DISCONTINUED | OUTPATIENT
Start: 2019-11-06 | End: 2019-11-09

## 2019-11-06 RX ORDER — MORPHINE SULFATE 50 MG/1
0.2 CAPSULE, EXTENDED RELEASE ORAL ONCE
Refills: 0 | Status: DISCONTINUED | OUTPATIENT
Start: 2019-11-06 | End: 2019-11-06

## 2019-11-06 RX ORDER — GLYCERIN ADULT
1 SUPPOSITORY, RECTAL RECTAL AT BEDTIME
Refills: 0 | Status: DISCONTINUED | OUTPATIENT
Start: 2019-11-06 | End: 2019-11-09

## 2019-11-06 RX ORDER — INFLUENZA VIRUS VACCINE 15; 15; 15; 15 UG/.5ML; UG/.5ML; UG/.5ML; UG/.5ML
0.5 SUSPENSION INTRAMUSCULAR ONCE
Refills: 0 | Status: COMPLETED | OUTPATIENT
Start: 2019-11-06 | End: 2019-11-08

## 2019-11-06 RX ORDER — OXYCODONE HYDROCHLORIDE 5 MG/1
5 TABLET ORAL
Refills: 0 | Status: DISCONTINUED | OUTPATIENT
Start: 2019-11-06 | End: 2019-11-07

## 2019-11-06 RX ORDER — METOCLOPRAMIDE HCL 10 MG
10 TABLET ORAL ONCE
Refills: 0 | Status: DISCONTINUED | OUTPATIENT
Start: 2019-11-06 | End: 2019-11-09

## 2019-11-06 RX ORDER — NALOXONE HYDROCHLORIDE 4 MG/.1ML
0.1 SPRAY NASAL
Refills: 0 | Status: DISCONTINUED | OUTPATIENT
Start: 2019-11-06 | End: 2019-11-07

## 2019-11-06 RX ORDER — NALOXONE HYDROCHLORIDE 4 MG/.1ML
0.1 SPRAY NASAL
Refills: 0 | Status: DISCONTINUED | OUTPATIENT
Start: 2019-11-06 | End: 2019-11-06

## 2019-11-06 RX ORDER — LANOLIN
1 OINTMENT (GRAM) TOPICAL EVERY 6 HOURS
Refills: 0 | Status: DISCONTINUED | OUTPATIENT
Start: 2019-11-06 | End: 2019-11-09

## 2019-11-06 RX ORDER — HYDROMORPHONE HYDROCHLORIDE 2 MG/ML
1 INJECTION INTRAMUSCULAR; INTRAVENOUS; SUBCUTANEOUS
Refills: 0 | Status: DISCONTINUED | OUTPATIENT
Start: 2019-11-06 | End: 2019-11-06

## 2019-11-06 RX ORDER — DEXAMETHASONE 0.5 MG/5ML
8 ELIXIR ORAL ONCE
Refills: 0 | Status: COMPLETED | OUTPATIENT
Start: 2019-11-06 | End: 2019-11-06

## 2019-11-06 RX ORDER — GLYCERIN ADULT
1 SUPPOSITORY, RECTAL RECTAL AT BEDTIME
Refills: 0 | Status: DISCONTINUED | OUTPATIENT
Start: 2019-11-06 | End: 2019-11-06

## 2019-11-06 RX ORDER — ACETAMINOPHEN 500 MG
975 TABLET ORAL EVERY 6 HOURS
Refills: 0 | Status: DISCONTINUED | OUTPATIENT
Start: 2019-11-06 | End: 2019-11-09

## 2019-11-06 RX ORDER — FAMOTIDINE 10 MG/ML
20 INJECTION INTRAVENOUS ONCE
Refills: 0 | Status: COMPLETED | OUTPATIENT
Start: 2019-11-06 | End: 2019-11-06

## 2019-11-06 RX ORDER — ENOXAPARIN SODIUM 100 MG/ML
40 INJECTION SUBCUTANEOUS DAILY
Refills: 0 | Status: DISCONTINUED | OUTPATIENT
Start: 2019-11-06 | End: 2019-11-09

## 2019-11-06 RX ORDER — ONDANSETRON 8 MG/1
4 TABLET, FILM COATED ORAL ONCE
Refills: 0 | Status: DISCONTINUED | OUTPATIENT
Start: 2019-11-06 | End: 2019-11-06

## 2019-11-06 RX ORDER — TETANUS TOXOID, REDUCED DIPHTHERIA TOXOID AND ACELLULAR PERTUSSIS VACCINE, ADSORBED 5; 2.5; 8; 8; 2.5 [IU]/.5ML; [IU]/.5ML; UG/.5ML; UG/.5ML; UG/.5ML
0.5 SUSPENSION INTRAMUSCULAR ONCE
Refills: 0 | Status: DISCONTINUED | OUTPATIENT
Start: 2019-11-06 | End: 2019-11-06

## 2019-11-06 RX ORDER — OXYCODONE HYDROCHLORIDE 5 MG/1
10 TABLET ORAL
Refills: 0 | Status: DISCONTINUED | OUTPATIENT
Start: 2019-11-06 | End: 2019-11-06

## 2019-11-06 RX ORDER — OXYCODONE HYDROCHLORIDE 5 MG/1
10 TABLET ORAL
Refills: 0 | Status: DISCONTINUED | OUTPATIENT
Start: 2019-11-06 | End: 2019-11-07

## 2019-11-06 RX ORDER — ONDANSETRON 8 MG/1
4 TABLET, FILM COATED ORAL EVERY 6 HOURS
Refills: 0 | Status: DISCONTINUED | OUTPATIENT
Start: 2019-11-06 | End: 2019-11-06

## 2019-11-06 RX ORDER — TETANUS TOXOID, REDUCED DIPHTHERIA TOXOID AND ACELLULAR PERTUSSIS VACCINE, ADSORBED 5; 2.5; 8; 8; 2.5 [IU]/.5ML; [IU]/.5ML; UG/.5ML; UG/.5ML; UG/.5ML
0.5 SUSPENSION INTRAMUSCULAR ONCE
Refills: 0 | Status: DISCONTINUED | OUTPATIENT
Start: 2019-11-06 | End: 2019-11-09

## 2019-11-06 RX ORDER — DIPHENHYDRAMINE HCL 50 MG
25 CAPSULE ORAL EVERY 6 HOURS
Refills: 0 | Status: DISCONTINUED | OUTPATIENT
Start: 2019-11-06 | End: 2019-11-06

## 2019-11-06 RX ORDER — ACETAMINOPHEN 500 MG
1000 TABLET ORAL ONCE
Refills: 0 | Status: COMPLETED | OUTPATIENT
Start: 2019-11-06 | End: 2019-11-06

## 2019-11-06 RX ORDER — SIMETHICONE 80 MG/1
80 TABLET, CHEWABLE ORAL EVERY 4 HOURS
Refills: 0 | Status: DISCONTINUED | OUTPATIENT
Start: 2019-11-06 | End: 2019-11-06

## 2019-11-06 RX ORDER — LANOLIN
1 OINTMENT (GRAM) TOPICAL EVERY 6 HOURS
Refills: 0 | Status: DISCONTINUED | OUTPATIENT
Start: 2019-11-06 | End: 2019-11-06

## 2019-11-06 RX ORDER — ONDANSETRON 8 MG/1
4 TABLET, FILM COATED ORAL EVERY 6 HOURS
Refills: 0 | Status: DISCONTINUED | OUTPATIENT
Start: 2019-11-06 | End: 2019-11-07

## 2019-11-06 RX ORDER — HEPARIN SODIUM 5000 [USP'U]/ML
5000 INJECTION INTRAVENOUS; SUBCUTANEOUS EVERY 12 HOURS
Refills: 0 | Status: DISCONTINUED | OUTPATIENT
Start: 2019-11-06 | End: 2019-11-06

## 2019-11-06 RX ORDER — MAGNESIUM HYDROXIDE 400 MG/1
30 TABLET, CHEWABLE ORAL
Refills: 0 | Status: DISCONTINUED | OUTPATIENT
Start: 2019-11-06 | End: 2019-11-09

## 2019-11-06 RX ORDER — ONDANSETRON 8 MG/1
4 TABLET, FILM COATED ORAL ONCE
Refills: 0 | Status: COMPLETED | OUTPATIENT
Start: 2019-11-06 | End: 2019-11-06

## 2019-11-06 RX ORDER — MAGNESIUM HYDROXIDE 400 MG/1
30 TABLET, CHEWABLE ORAL
Refills: 0 | Status: DISCONTINUED | OUTPATIENT
Start: 2019-11-06 | End: 2019-11-06

## 2019-11-06 RX ORDER — HYDROMORPHONE HYDROCHLORIDE 2 MG/ML
0.5 INJECTION INTRAMUSCULAR; INTRAVENOUS; SUBCUTANEOUS
Refills: 0 | Status: DISCONTINUED | OUTPATIENT
Start: 2019-11-06 | End: 2019-11-06

## 2019-11-06 RX ORDER — DIPHENHYDRAMINE HCL 50 MG
25 CAPSULE ORAL EVERY 6 HOURS
Refills: 0 | Status: DISCONTINUED | OUTPATIENT
Start: 2019-11-06 | End: 2019-11-09

## 2019-11-06 RX ADMIN — Medication 975 MILLIGRAM(S): at 18:30

## 2019-11-06 RX ADMIN — SODIUM CHLORIDE 75 MILLILITER(S): 9 INJECTION, SOLUTION INTRAVENOUS at 10:51

## 2019-11-06 RX ADMIN — FAMOTIDINE 20 MILLIGRAM(S): 10 INJECTION INTRAVENOUS at 08:06

## 2019-11-06 RX ADMIN — Medication 30 MILLIGRAM(S): at 21:12

## 2019-11-06 RX ADMIN — Medication 30 MILLILITER(S): at 08:06

## 2019-11-06 RX ADMIN — Medication 101.6 MILLIGRAM(S): at 13:42

## 2019-11-06 RX ADMIN — Medication 1000 MILLIGRAM(S): at 13:25

## 2019-11-06 RX ADMIN — Medication 125 MILLIUNIT(S)/MIN: at 10:51

## 2019-11-06 RX ADMIN — Medication 30 MILLIGRAM(S): at 22:00

## 2019-11-06 RX ADMIN — Medication 10 MILLIGRAM(S): at 08:06

## 2019-11-06 RX ADMIN — ENOXAPARIN SODIUM 40 MILLIGRAM(S): 100 INJECTION SUBCUTANEOUS at 17:51

## 2019-11-06 RX ADMIN — Medication 975 MILLIGRAM(S): at 17:51

## 2019-11-06 RX ADMIN — Medication 400 MILLIGRAM(S): at 13:05

## 2019-11-06 RX ADMIN — ONDANSETRON 4 MILLIGRAM(S): 8 TABLET, FILM COATED ORAL at 11:20

## 2019-11-06 NOTE — DISCHARGE NOTE OB - MEDICATION SUMMARY - MEDICATIONS TO STOP TAKING
I will STOP taking the medications listed below when I get home from the hospital:  None I will STOP taking the medications listed below when I get home from the hospital:    heparin 5000 units/0.5 mL injectable solution  -- 5000 unit(s) injectable 2 times a day

## 2019-11-06 NOTE — OB RN DELIVERY SUMMARY - BABY A: WEIGHT IN OUNCES (FROM GRAMS), DELIVERY
89 yr old female with hyperlipidemia, lives alone admitted with weakness, lethargy and confusion. She was febrile in the ED, initial concern for meningitis was ruled out with LP. UA was weakly positive for infection, hence started for antibiotics. 2

## 2019-11-06 NOTE — DISCHARGE NOTE OB - MATERIALS PROVIDED
Vaccinations/[facility]  Screening Program/  Immunization Record/Bottle Feeding Log/Breastfeeding Mother’s Support Group Information/Guide to Postpartum Care/[facility] Hearing Screen Program/Back To Sleep Handout/Shaken Baby Prevention Handout/Breastfeeding Guide and Packet/Tdap Vaccination (VIS Pub Date: 2012)

## 2019-11-06 NOTE — OB PROVIDER H&P - HISTORY OF PRESENT ILLNESS
40yo female  EDC 19  presents@37.4wks for scheduled  rltcs/BTL  +AP course heparin and baby ASA, last dose of heparin 19 in am. +PMHx Antiphospholipid Syndrome. Decreased JASON noted ATU sono, on 19 otherwise unremarkable.   Denies VB,ROM or UCs today.  +FM

## 2019-11-06 NOTE — OB PROVIDER H&P - ASSESSMENT
Admit to L&D  donnell rLTCS 7 BTL  NPO  routine labs/coags  EFM/TOCO  T&X- 2u  anesthesia consult  Verito Alvarez PAC  11-06-19 @ 07:38

## 2019-11-06 NOTE — OB NEONATOLOGY/PEDIATRICIAN DELIVERY SUMMARY - NSPEDSNEONOTESA_OBGYN_ALL_OB_FT
37.4wk M born to 38yo , A+, GBS-, PNL- and immune by scheduled c/s. Maternal hx of antiphospholipid sx, hypothyroidism, venous insufficency, fibroids, multiple miscarriages/D&Cs. Pregnancy complicated by oligohydramnios. Baby born vigorous, w/d/s/s, Apgars 9/9. Peds called back to room at 20 minutes of life due to desats to 80s and cyanosis, and large amount of secretions requiring suctioning. Baby started on CPAP 6/40%, and unable to wean to maintain sats >93%. Baby brought to NICU for further care after 15 minutes of CPAP.

## 2019-11-06 NOTE — DISCHARGE NOTE OB - MEDICATION SUMMARY - MEDICATIONS TO TAKE
I will START or STAY ON the medications listed below when I get home from the hospital:  None I will START or STAY ON the medications listed below when I get home from the hospital:    acetaminophen 325 mg oral tablet  -- 3 tab(s) by mouth every 6 hours  -- Indication: For pain    enoxaparin  -- 40 milligram(s) subcutaneously once a day  -- Indication: For  delivery delivered

## 2019-11-06 NOTE — OB RN DELIVERY SUMMARY - NS_INTRAPARTUMABXTYPE_OBGYN_ALL_OB
Chief Complaint   Patient presents with   • Unilateral Weakness     left side, onset woke up yesterday 7am   • Arm Pain     left   • Facial Droop     left side   • Slurred Speech     Pt bib ems with above complaints. Pt said that he woke up yesterday morning around 7am with weakness on his left arm and pain.   Ems arrived noticed unilateral weakness, slurred speech and facial droop left side.   fsbs 80   No antibiotics or any antibiotics < 2 hrs prior to birth

## 2019-11-06 NOTE — DISCHARGE NOTE OB - CARE PROVIDER_API CALL
Silvestre Dumont)  Obstetrics and Gynecology  [address] [facility], [place] [address]  Phone: [phone]  Fax: [phone]  Follow Up Time:

## 2019-11-06 NOTE — DISCHARGE NOTE OB - PATIENT PORTAL LINK FT
You can access the FollowMyHealth Patient Portal offered by [facility] by registering at the following website: [email or web address]. By joining [employer]’s FollowMyHealth portal, you will also be able to view your health information using other applications (apps) compatible with our system.

## 2019-11-06 NOTE — OB PROVIDER H&P - NSHPPHYSICALEXAM_GEN_ALL_CORE
T(C): 36.8 (11-06-19 @ 07:19), Max: 36.8 (11-05-19 @ 10:48)  HR: 86 (11-06-19 @ 07:19) (81 - 104)  BP: 124/82 (11-06-19 @ 07:19) (106/60 - 135/78)  RR: 15 (11-06-19 @ 07:19) (15 - 16)  SpO2: 97% (11-05-19 @ 14:31) (96% - 99%)Height (cm): 165.1 (11-06-19 @ 07:19)  Weight (kg): 81.6 (11-06-19 @ 07:19)  BMI (kg/m2): 29.9 (11-06-19 @ 07:19)  BSA (m2): 1.89 (11-06-19 @ 07:19)    A&Ox3  Heart: RRR, S1&S2, no S3  Lungs: Clear bilateral to auscultation, good inspiratory /expiratory effort              no rhonchi, no rales  Abd: soft, Gravid, TOCO in place           +pfannenstial scar  EFM:  130 bpm/moderate variabilty/+accelerations/no decelerations/CAT 1  TOCO:  no UC  Ext:  FROM / no edema, multiple varices  Neuro: grossly intact

## 2019-11-06 NOTE — OB RN PATIENT PROFILE - PMH
Antiphospholipid antibody syndrome  last hematologist evaluation (Dr. Denton) in 10/2019  Decreased sensation of lower extremity  R/T  Venous insufficiency last vascular evaluation Dr. Amaral 2016  Hypothyroidism  last endo evaluation Dr. RIOS in second trimester  Uterine fibroid    Uterine polyp    Venous insufficiency (chronic) (peripheral)

## 2019-11-07 LAB
BASOPHILS # BLD AUTO: 0.06 K/UL — SIGNIFICANT CHANGE UP (ref 0–0.2)
BASOPHILS NFR BLD AUTO: 0.4 % — SIGNIFICANT CHANGE UP (ref 0–2)
EOSINOPHIL # BLD AUTO: 0.03 K/UL — SIGNIFICANT CHANGE UP (ref 0–0.5)
EOSINOPHIL NFR BLD AUTO: 0.2 % — SIGNIFICANT CHANGE UP (ref 0–6)
HCT VFR BLD CALC: 28 % — LOW (ref 34.5–45)
HGB BLD-MCNC: 9.4 G/DL — LOW (ref 11.5–15.5)
IMM GRANULOCYTES NFR BLD AUTO: 1 % — SIGNIFICANT CHANGE UP (ref 0–1.5)
LYMPHOCYTES # BLD AUTO: 15.7 % — SIGNIFICANT CHANGE UP (ref 13–44)
LYMPHOCYTES # BLD AUTO: 2.31 K/UL — SIGNIFICANT CHANGE UP (ref 1–3.3)
MCHC RBC-ENTMCNC: 31.2 PG — SIGNIFICANT CHANGE UP (ref 27–34)
MCHC RBC-ENTMCNC: 33.6 % — SIGNIFICANT CHANGE UP (ref 32–36)
MCV RBC AUTO: 93 FL — SIGNIFICANT CHANGE UP (ref 80–100)
MONOCYTES # BLD AUTO: 0.89 K/UL — SIGNIFICANT CHANGE UP (ref 0–0.9)
MONOCYTES NFR BLD AUTO: 6.1 % — SIGNIFICANT CHANGE UP (ref 2–14)
NEUTROPHILS # BLD AUTO: 11.23 K/UL — HIGH (ref 1.8–7.4)
NEUTROPHILS NFR BLD AUTO: 76.6 % — SIGNIFICANT CHANGE UP (ref 43–77)
NRBC # FLD: 0 K/UL — SIGNIFICANT CHANGE UP (ref 0–0)
PLATELET # BLD AUTO: 161 K/UL — SIGNIFICANT CHANGE UP (ref 150–400)
PMV BLD: 10.6 FL — SIGNIFICANT CHANGE UP (ref 7–13)
RBC # BLD: 3.01 M/UL — LOW (ref 3.8–5.2)
RBC # FLD: 13.8 % — SIGNIFICANT CHANGE UP (ref 10.3–14.5)
WBC # BLD: 14.67 K/UL — HIGH (ref 3.8–10.5)
WBC # FLD AUTO: 14.67 K/UL — HIGH (ref 3.8–10.5)

## 2019-11-07 RX ORDER — SENNA PLUS 8.6 MG/1
1 TABLET ORAL
Refills: 0 | Status: DISCONTINUED | OUTPATIENT
Start: 2019-11-07 | End: 2019-11-09

## 2019-11-07 RX ORDER — IBUPROFEN 200 MG
600 TABLET ORAL EVERY 6 HOURS
Refills: 0 | Status: DISCONTINUED | OUTPATIENT
Start: 2019-11-07 | End: 2019-11-09

## 2019-11-07 RX ORDER — LEVOTHYROXINE SODIUM 125 MCG
75 TABLET ORAL DAILY
Refills: 0 | Status: DISCONTINUED | OUTPATIENT
Start: 2019-11-07 | End: 2019-11-09

## 2019-11-07 RX ORDER — FERROUS SULFATE 325(65) MG
325 TABLET ORAL THREE TIMES A DAY
Refills: 0 | Status: DISCONTINUED | OUTPATIENT
Start: 2019-11-07 | End: 2019-11-09

## 2019-11-07 RX ADMIN — Medication 325 MILLIGRAM(S): at 15:42

## 2019-11-07 RX ADMIN — SIMETHICONE 80 MILLIGRAM(S): 80 TABLET, CHEWABLE ORAL at 09:10

## 2019-11-07 RX ADMIN — MAGNESIUM HYDROXIDE 30 MILLILITER(S): 400 TABLET, CHEWABLE ORAL at 09:09

## 2019-11-07 RX ADMIN — Medication 975 MILLIGRAM(S): at 09:10

## 2019-11-07 RX ADMIN — Medication 600 MILLIGRAM(S): at 19:00

## 2019-11-07 RX ADMIN — Medication 600 MILLIGRAM(S): at 06:10

## 2019-11-07 RX ADMIN — Medication 975 MILLIGRAM(S): at 00:50

## 2019-11-07 RX ADMIN — Medication 975 MILLIGRAM(S): at 15:43

## 2019-11-07 RX ADMIN — Medication 75 MICROGRAM(S): at 06:10

## 2019-11-07 RX ADMIN — Medication 600 MILLIGRAM(S): at 12:12

## 2019-11-07 RX ADMIN — Medication 975 MILLIGRAM(S): at 22:00

## 2019-11-07 RX ADMIN — Medication 975 MILLIGRAM(S): at 16:22

## 2019-11-07 RX ADMIN — ENOXAPARIN SODIUM 40 MILLIGRAM(S): 100 INJECTION SUBCUTANEOUS at 18:18

## 2019-11-07 RX ADMIN — Medication 600 MILLIGRAM(S): at 12:55

## 2019-11-07 RX ADMIN — Medication 975 MILLIGRAM(S): at 00:20

## 2019-11-07 RX ADMIN — Medication 975 MILLIGRAM(S): at 22:30

## 2019-11-07 RX ADMIN — Medication 600 MILLIGRAM(S): at 18:18

## 2019-11-07 RX ADMIN — SIMETHICONE 80 MILLIGRAM(S): 80 TABLET, CHEWABLE ORAL at 15:42

## 2019-11-07 RX ADMIN — SENNA PLUS 1 TABLET(S): 8.6 TABLET ORAL at 18:34

## 2019-11-07 RX ADMIN — Medication 975 MILLIGRAM(S): at 09:50

## 2019-11-07 NOTE — CONSULT NOTE ADULT - SUBJECTIVE AND OBJECTIVE BOX
Chief Complaint:  Patient is a 39y old  Female who presents with a chief complaint of LTCS POD1 (2019 06:57)      HPI: patient known to me.  She has APS and she has a history of miscarriages.  She was treated with lovenox during the recent pregnancy and she is now post partum and had a  section.  She says that she is recovering.  She does have anemia and a leucocytosis.      Medications:  acetaminophen   Tablet .. 975 milliGRAM(s) Oral every 6 hours  diphenhydrAMINE 25 milliGRAM(s) Oral every 6 hours PRN  diphtheria/tetanus/pertussis (acellular) Vaccine (ADAcel) 0.5 milliLiter(s) IntraMuscular once  enoxaparin Injectable 40 milliGRAM(s) SubCutaneous daily  ferrous    sulfate 325 milliGRAM(s) Oral three times a day  glycerin Suppository - Adult 1 Suppository(s) Rectal at bedtime PRN  ibuprofen  Tablet. 600 milliGRAM(s) Oral every 6 hours  influenza   Vaccine 0.5 milliLiter(s) IntraMuscular once  lanolin Ointment 1 Application(s) Topical every 6 hours PRN  levothyroxine 75 MICROGram(s) Oral daily  magnesium hydroxide Suspension 30 milliLiter(s) Oral two times a day PRN  metoclopramide Injectable 10 milliGRAM(s) IV Push once PRN  oxyCODONE    IR 5 milliGRAM(s) Oral once PRN  oxyCODONE    IR 5 milliGRAM(s) Oral every 3 hours PRN  prenatal multivitamin 1 Tablet(s) Oral daily  senna 1 Tablet(s) Oral two times a day  simethicone 80 milliGRAM(s) Chew every 4 hours PRN        Allergies:  No Known Allergies    FAMILY HISTORY:  Hypertension  Family history of DVT      Social history: no tobacco, ETOH, or IVDA    PAST MEDICAL & SURGICAL HISTORY:  Decreased sensation of lower extremity: R/T  Venous insufficiency last vascular evaluation Dr. Amaral   Uterine fibroid  Uterine polyp  Venous insufficiency (chronic) (peripheral)  Antiphospholipid antibody syndrome: last hematologist evaluaton (Dr. Denton) in 10/2019  Hypothyroidism: last endo evaluation Dr. RIOS in second trimester  H/O miscarriage, currently pregnant: s/p D &amp; E at 19 weeks, s/p D &amp; C at 9.5 weeks  H/O:  section:   H/O benign breast biopsy  H/O dilation and curettage: x3 in 2006, x1 in     REVIEW OF SYSTEMS      General: Has fatigue.  No fevers, sweats.  Appetite fair	    Skin/Breast: No rash or itch.  Has some bruises on legs  	  Ophthalmologic: No vision changes  	  ENMT:	No hearing loss, nosebleeds, sore throat    Respiratory and Thorax: No SOB, cough, hemoptysis  	  Cardiovascular:	No CP or palpitations    Gastrointestinal:	No BM yet.  has gas.  Occasional nausea.    Genitourinary:	No dysuria or hematuria    Musculoskeletal:	 Has some back pain (chronic) and leg swelling (chronic)    Neurological:	No HA or dizziness.  Some tingling in feet 9chronic)    Psychiatric:	has anxiety      Vitals:  Vital Signs Last 24 Hrs  T(C): 36.8 (2019 15:40), Max: 36.8 (2019 01:44)  T(F): 98.3 (2019 15:40), Max: 98.3 (2019 05:58)  HR: 84 (2019 15:40) (68 - 84)  BP: 106/63 (2019 15:40) (106/63 - 115/67)  BP(mean): --  RR: 16 (2019 15:40) (16 - 18)  SpO2: 98% (2019 15:40) (98% - 100%)    Pex:  alert NAD  EOMI anicteric sclera  Cv s1 S2 RRR  Lungs clear B/L  abd mild tender and distended  Trace LE edema no tenderness    Labs:                        9.4    14.67 )-----------( 161      ( 2019 05:45 )             28.0     CBC Full  -  ( 2019 05:45 )  WBC Count : 14.67 K/uL  RBC Count : 3.01 M/uL  Hemoglobin : 9.4 g/dL  Hematocrit : 28.0 %  Platelet Count - Automated : 161 K/uL  Mean Cell Volume : 93.0 fL  Mean Cell Hemoglobin : 31.2 pg  Mean Cell Hemoglobin Concentration : 33.6 %  Auto Neutrophil # : 11.23 K/uL  Auto Lymphocyte # : 2.31 K/uL  Auto Monocyte # : 0.89 K/uL  Auto Eosinophil # : 0.03 K/uL  Auto Basophil # : 0.06 K/uL  Auto Neutrophil % : 76.6 %  Auto Lymphocyte % : 15.7 %  Auto Monocyte % : 6.1 %  Auto Eosinophil % : 0.2 %  Auto Basophil % : 0.4 %          PT/INR - ( 2019 07:30 )   PT: 11.0 SEC;   INR: 0.97          PTT - ( 2019 07:30 )  PTT:27.4 SEC  [id number]

## 2019-11-07 NOTE — PROGRESS NOTE ADULT - PROBLEM SELECTOR PLAN 1
- Continue regular diet.  - Increase ambulation.  - Continue motrin, tylenol, oxycodone PRN for pain control.  - F/u AM CBC    Mayur Silvestre PGY1

## 2019-11-07 NOTE — PROGRESS NOTE ADULT - SUBJECTIVE AND OBJECTIVE BOX
OB Progress Note:  Delivery, POD#1    S: 40yo POD#1 s/p LTCS . Her pain is well controlled. She is tolerating a regular diet and passing flatus. Denies N/V. Denies CP/SOB/lightheadedness/dizziness. Pt denies sxs of PEC: denies headache, visual changes, RUQ pain, respiratory distress  She is ambulating without difficulty.   Voiding spontaneously.     O:   Vital Signs Last 24 Hrs  T(C): 36.8 (2019 05:58), Max: 36.8 (2019 06:58)  T(F): 98.3 (2019 05:58), Max: 98.3 (2019 05:58)  HR: 80 (2019 05:58) (68 - 245)  BP: 111/61 (2019 05:58) (98/56 - 124/82)  BP(mean): 65 (2019 14:30) (65 - 77)  RR: 16 (2019 05:58) (10 - 18)  SpO2: 99% (2019 05:58) (77% - 100%)    Labs:  Blood type: A Positive  Rubella IgG: RPR: Negative                          9.4<L>   14.67<H> >-----------< 161    (  @ 05:45 )             28.0<L>                        12.3   10.78<H> >-----------< 166    (  @ 07:16 )             36.2                  PE:  General: NAD  Abdomen: Mildly distended, appropriately tender, incision c/d/i.  Extremities: No erythema, no pitting edema

## 2019-11-07 NOTE — CONSULT NOTE ADULT - ASSESSMENT
Thrombophilia with APS.  Had a successful pregnancy with treatment with Lovenox during the pregnancy.  She is now s/p  and will remain on Lovenox for 6 weeks and monitor for signs and symptoms of thromboembolism.      Anemia post-op.  Hgb adequate and can monitor.    Mild leucocytosis post partum and had it while pregnant.  Favor reactive and assess for resolution in 4-6 weeks.     management per OB/Gyn

## 2019-11-07 NOTE — PROGRESS NOTE ADULT - SUBJECTIVE AND OBJECTIVE BOX
She is a  39y woman who is now post-operative day: 1    Subjective:  Pt without complaints.  Pain contolled.  +OOB.  [place] in place.     No nausea/vomiting.  No flatus.  Lochia WNL.    Objective:  Vital Signs Last 24 Hrs  T(C): 36.8 (2019 05:58), Max: 36.8 (2019 06:58)  T(F): 98.3 (2019 05:58), Max: 98.3 (2019 05:58)  HR: 80 (2019 05:58) (68 - 245)  BP: 111/61 (2019 05:58) (98/56 - 124/82)  BP(mean): 65 (2019 14:30) (65 - 77)  RR: 16 (2019 05:58) (10 - 18)  SpO2: 99% (2019 05:58) (77% - 100%)    Constitutional: NAD  Abdomen: Soft, non-tender, non-distended.  Uterine fundus firm, non-tender.  Incision: Clean, dry, and intact  Ext: No calf tenderness bilaterally    LABS:                        12.3   10.78 )-----------( 166      ( 2019 07:16 )             36.2     ABO Interpretation: A ( @ 07:13)  Rh Interpretation: Positive ( @ 07:13)  Antibody Screen: Negative ( @ 07:13)      Allergies    No Known Allergies    Intolerances      MEDICATIONS  (STANDING):  acetaminophen   Tablet .. 975 milliGRAM(s) Oral every 6 hours  diphtheria/tetanus/pertussis (acellular) Vaccine (ADAcel) 0.5 milliLiter(s) IntraMuscular once  enoxaparin Injectable 40 milliGRAM(s) SubCutaneous daily  ibuprofen  Tablet. 600 milliGRAM(s) Oral every 6 hours  influenza   Vaccine 0.5 milliLiter(s) IntraMuscular once  levothyroxine 75 MICROGram(s) Oral daily    MEDICATIONS  (PRN):  diphenhydrAMINE 25 milliGRAM(s) Oral every 6 hours PRN Itching  glycerin Suppository - Adult 1 Suppository(s) Rectal at bedtime PRN Constipation  lanolin Ointment 1 Application(s) Topical every 6 hours PRN Sore Nipples  magnesium hydroxide Suspension 30 milliLiter(s) Oral two times a day PRN Constipation  metoclopramide Injectable 10 milliGRAM(s) IV Push once PRN Nausea and/or Vomiting  naloxone Injectable 0.1 milliGRAM(s) IV Push every 3 minutes PRN For ANY of the following changes in patient status:  A. RR LESS THAN 10 breaths per minute, B. Oxygen saturation LESS THAN 90%, C. Sedation score of 6  ondansetron Injectable 4 milliGRAM(s) IV Push every 6 hours PRN Nausea  oxyCODONE    IR 5 milliGRAM(s) Oral every 3 hours PRN Mild Pain (1 - 3)  oxyCODONE    IR 10 milliGRAM(s) Oral every 3 hours PRN Moderate Pain (4 - 6)  oxyCODONE    IR 5 milliGRAM(s) Oral once PRN Moderate to Severe Pain (4-10)  oxyCODONE    IR 5 milliGRAM(s) Oral every 3 hours PRN Moderate to Severe Pain (4-10)  simethicone 80 milliGRAM(s) Chew every 4 hours PRN Gas        Assessment and Plan  Pt stable POD #1 s/p  section  -continue routine postoperative and postpartum care  -encourage ambulation  -analgesia prn

## 2019-11-07 NOTE — PROGRESS NOTE ADULT - SUBJECTIVE AND OBJECTIVE BOX
Postop Day  __1_ s/p   C- Section    THERAPY:    [  x] Spinal morphine .2__ mg  [  ] Epidural morphine ___ mg  [  ] IV PCA Hydromophone 1 mg/ml    OBJECTIVE:    Sedation Score:	  [ x ] Alert	    [  ] Drowsy        [  ] Arousable	[  ] Asleep	[  ] Unresponsive    Side Effects:	  [ x ] None	     [  ] Nausea        [  ] Pruritus        [  ] Weaknes   [  ] Numbness   [  ] Other:        ASSESSMENT/ PLAN  [  ] Continue   [  ] Discpntinue   [ x ]Documentation and Verification of current medications     Comments: no anesthetic complications were noted in record.

## 2019-11-08 ENCOUNTER — APPOINTMENT (OUTPATIENT)
Dept: ANTEPARTUM | Facility: CLINIC | Age: 39
End: 2019-11-08

## 2019-11-08 ENCOUNTER — APPOINTMENT (OUTPATIENT)
Dept: ANTEPARTUM | Facility: HOSPITAL | Age: 39
End: 2019-11-08

## 2019-11-08 RX ORDER — ASCORBIC ACID 60 MG
500 TABLET,CHEWABLE ORAL DAILY
Refills: 0 | Status: DISCONTINUED | OUTPATIENT
Start: 2019-11-08 | End: 2019-11-09

## 2019-11-08 RX ADMIN — Medication 600 MILLIGRAM(S): at 05:55

## 2019-11-08 RX ADMIN — Medication 600 MILLIGRAM(S): at 01:45

## 2019-11-08 RX ADMIN — Medication 975 MILLIGRAM(S): at 21:01

## 2019-11-08 RX ADMIN — SENNA PLUS 1 TABLET(S): 8.6 TABLET ORAL at 17:09

## 2019-11-08 RX ADMIN — INFLUENZA VIRUS VACCINE 0.5 MILLILITER(S): 15; 15; 15; 15 SUSPENSION INTRAMUSCULAR at 21:02

## 2019-11-08 RX ADMIN — Medication 975 MILLIGRAM(S): at 22:00

## 2019-11-08 RX ADMIN — Medication 600 MILLIGRAM(S): at 01:03

## 2019-11-08 RX ADMIN — Medication 600 MILLIGRAM(S): at 18:52

## 2019-11-08 RX ADMIN — Medication 600 MILLIGRAM(S): at 18:12

## 2019-11-08 RX ADMIN — Medication 975 MILLIGRAM(S): at 03:21

## 2019-11-08 RX ADMIN — Medication 1 TABLET(S): at 12:38

## 2019-11-08 RX ADMIN — Medication 975 MILLIGRAM(S): at 15:11

## 2019-11-08 RX ADMIN — Medication 600 MILLIGRAM(S): at 13:10

## 2019-11-08 RX ADMIN — SENNA PLUS 1 TABLET(S): 8.6 TABLET ORAL at 05:56

## 2019-11-08 RX ADMIN — ENOXAPARIN SODIUM 40 MILLIGRAM(S): 100 INJECTION SUBCUTANEOUS at 18:12

## 2019-11-08 RX ADMIN — Medication 600 MILLIGRAM(S): at 12:37

## 2019-11-08 RX ADMIN — Medication 325 MILLIGRAM(S): at 21:01

## 2019-11-08 RX ADMIN — Medication 325 MILLIGRAM(S): at 15:11

## 2019-11-08 RX ADMIN — Medication 500 MILLIGRAM(S): at 15:11

## 2019-11-08 RX ADMIN — Medication 75 MICROGRAM(S): at 05:56

## 2019-11-08 RX ADMIN — Medication 325 MILLIGRAM(S): at 09:06

## 2019-11-08 RX ADMIN — Medication 975 MILLIGRAM(S): at 09:40

## 2019-11-08 RX ADMIN — Medication 975 MILLIGRAM(S): at 09:07

## 2019-11-08 RX ADMIN — Medication 975 MILLIGRAM(S): at 15:50

## 2019-11-08 NOTE — PROGRESS NOTE ADULT - SUBJECTIVE AND OBJECTIVE BOX
Postpartum Note,  Section  She is a  39y woman who is now post-operative day: 2    Subjective:  The patient feels well.  She is ambulating.   She is tolerating regular diet.  She denies nausea and vomiting.  She is voiding.  Her pain is controlled.  She reports normal postpartum bleeding.        Physical exam:    Vital Signs Last 24 Hrs  T(C): 36.8 (2019 05:43), Max: 36.8 (2019 15:40)  T(F): 98.3 (2019 05:43), Max: 98.3 (2019 15:40)  HR: 84 (2019 05:43) (84 - 88)  BP: 105/54 (2019 05:43) (103/60 - 106/63)  BP(mean): --  RR: 17 (2019 05:43) (16 - 17)  SpO2: 99% (2019 05:43) (98% - 99%)    Gen: NAD  Breast: Soft, nontender, not engorged.  Abdomen: Soft, nontender, no distension , firm uterine fundus at umbilicus.  Incision: Clean, dry, and intact with steri strips  Pelvic: Normal lochia noted  Ext: No calf tenderness    LABS:                        9.4    14.67 )-----------( 161      ( 2019 05:45 )             28.0                   Allergies    No Known Allergies    Intolerances      MEDICATIONS  (STANDING):  acetaminophen   Tablet .. 975 milliGRAM(s) Oral every 6 hours  diphtheria/tetanus/pertussis (acellular) Vaccine (ADAcel) 0.5 milliLiter(s) IntraMuscular once  enoxaparin Injectable 40 milliGRAM(s) SubCutaneous daily  ferrous    sulfate 325 milliGRAM(s) Oral three times a day  ibuprofen  Tablet. 600 milliGRAM(s) Oral every 6 hours  influenza   Vaccine 0.5 milliLiter(s) IntraMuscular once  levothyroxine 75 MICROGram(s) Oral daily  prenatal multivitamin 1 Tablet(s) Oral daily  senna 1 Tablet(s) Oral two times a day    MEDICATIONS  (PRN):  diphenhydrAMINE 25 milliGRAM(s) Oral every 6 hours PRN Itching  glycerin Suppository - Adult 1 Suppository(s) Rectal at bedtime PRN Constipation  lanolin Ointment 1 Application(s) Topical every 6 hours PRN Sore Nipples  magnesium hydroxide Suspension 30 milliLiter(s) Oral two times a day PRN Constipation  metoclopramide Injectable 10 milliGRAM(s) IV Push once PRN Nausea and/or Vomiting  oxyCODONE    IR 5 milliGRAM(s) Oral once PRN Moderate to Severe Pain (4-10)  oxyCODONE    IR 5 milliGRAM(s) Oral every 3 hours PRN Moderate to Severe Pain (4-10)  simethicone 80 milliGRAM(s) Chew every 4 hours PRN Gas        Assessment and Plan  POD #2 s/p  section  Doing well.  Encourage ambulation.

## 2019-11-08 NOTE — PROGRESS NOTE ADULT - SUBJECTIVE AND OBJECTIVE BOX
Patient assessed at 0800.  Subjective  Pain: Patient denies any pain at the time of assessment. Pain being managed well by pain management protocol.  Complaints: None. Patient denies any headache, blur vision, dizziness and/or weakness/fatigue. Patient denies any pain from varicose veins of legs bilaterally. Patient reports that she has had the varicose veins for 20years.   Milestones:  Alert and orientedx3. Out of bed ambulating. Positive flatus. Negative bowel movement, denies urge. Voiding.  Tolerating a regular diet.  Infant feeding: formula feeding  Feeding related issues and/or concerns: infant in the NICU 37weeks    OBJECTIVE:  T(C): 36.8 (19 @ 05:43), Max: 36.8 (19 @ 15:40)  HR: 84 (19 @ 05:43) (84 - 88)  BP: 105/54 (19 @ 05:43) (103/60 - 106/63)  RR: 17 (19 @ 05:43) (16 - 17)  SpO2: 99% (19 @ 05:43) (98% - 99%)    Wt(kg): --                        9.4    14.67 )-----------( 161      ( 2019 05:45 )             28.0           Blood Type: A Positive  RPR: Negative  Rubella: Immune        MEDICATIONS  (STANDING):  acetaminophen   Tablet .. 975 milliGRAM(s) Oral every 6 hours  diphtheria/tetanus/pertussis (acellular) Vaccine (ADAcel) 0.5 milliLiter(s) IntraMuscular once  enoxaparin Injectable 40 milliGRAM(s) SubCutaneous daily  ferrous    sulfate 325 milliGRAM(s) Oral three times a day  ibuprofen  Tablet. 600 milliGRAM(s) Oral every 6 hours  influenza   Vaccine 0.5 milliLiter(s) IntraMuscular once  levothyroxine 75 MICROGram(s) Oral daily  prenatal multivitamin 1 Tablet(s) Oral daily  senna 1 Tablet(s) Oral two times a day    MEDICATIONS  (PRN):  diphenhydrAMINE 25 milliGRAM(s) Oral every 6 hours PRN Itching  glycerin Suppository - Adult 1 Suppository(s) Rectal at bedtime PRN Constipation  lanolin Ointment 1 Application(s) Topical every 6 hours PRN Sore Nipples  magnesium hydroxide Suspension 30 milliLiter(s) Oral two times a day PRN Constipation  metoclopramide Injectable 10 milliGRAM(s) IV Push once PRN Nausea and/or Vomiting  oxyCODONE    IR 5 milliGRAM(s) Oral once PRN Moderate to Severe Pain (4-10)  oxyCODONE    IR 5 milliGRAM(s) Oral every 3 hours PRN Moderate to Severe Pain (4-10)  simethicone 80 milliGRAM(s) Chew every 4 hours PRN Gas        ASSESSMENT:  38y/o     Day#2   repeat post-operative  section delivery with bilateral tubal ligation.  Condition: Stable  Past Medical/Surgical/OB/GYN History significant for: antiphospholipid syndrome on lovenox, fibroid and polyp, varicose veins in lower extremities bilaterally, hypothyroidism on synthroid, benign breast biopsy,  section 2016, D&E at 19weeks 2017,  2018  Current Issues: EBL 800cc  Lung sounds clear bilaterally.  Breasts: varela, nontender  Nipples: intact  Abdomen: Soft, nondistended and nontender. Bowel sounds present. Fundus firm  Abdominal incision: Clean, dry and intact with steri strips.   Vaginal: Lochia light rubra  Extremities: Slight edema noted bilaterally and equal with varicose veins throughout lower extremities bilaterally, nontender with no erythremic areas noted. Positive pedal pulses. No palpable veins noted  Other relevant physical exam findings: none    Plan  Continue routine post-operative and postpartum care  Increase ambulation, analgesia PRN and pain medication protocol of standing ibuprofen and acetaminophen and oxycodone prn  Encouraged to wear abdominal binder for support and to use incentive spirometer  Discussed breast/nipple care for a nonbreastfeeding mother.   Discharge to home on lovenox for 6weeks due to history of antiphospholipid syndrome.

## 2019-11-09 VITALS
SYSTOLIC BLOOD PRESSURE: 118 MMHG | RESPIRATION RATE: 18 BRPM | OXYGEN SATURATION: 100 % | HEART RATE: 94 BPM | DIASTOLIC BLOOD PRESSURE: 72 MMHG | TEMPERATURE: 99 F

## 2019-11-09 RX ORDER — ACETAMINOPHEN 500 MG
3 TABLET ORAL
Qty: 0 | Refills: 0 | DISCHARGE
Start: 2019-11-09

## 2019-11-09 RX ORDER — ENOXAPARIN SODIUM 100 MG/ML
40 INJECTION SUBCUTANEOUS
Qty: 0 | Refills: 0 | DISCHARGE
Start: 2019-11-09

## 2019-11-09 RX ADMIN — SENNA PLUS 1 TABLET(S): 8.6 TABLET ORAL at 06:14

## 2019-11-09 RX ADMIN — Medication 600 MILLIGRAM(S): at 12:54

## 2019-11-09 RX ADMIN — SIMETHICONE 80 MILLIGRAM(S): 80 TABLET, CHEWABLE ORAL at 09:51

## 2019-11-09 RX ADMIN — Medication 975 MILLIGRAM(S): at 03:02

## 2019-11-09 RX ADMIN — Medication 600 MILLIGRAM(S): at 01:27

## 2019-11-09 RX ADMIN — Medication 600 MILLIGRAM(S): at 07:00

## 2019-11-09 RX ADMIN — Medication 600 MILLIGRAM(S): at 06:14

## 2019-11-09 RX ADMIN — Medication 600 MILLIGRAM(S): at 13:39

## 2019-11-09 RX ADMIN — ENOXAPARIN SODIUM 40 MILLIGRAM(S): 100 INJECTION SUBCUTANEOUS at 15:02

## 2019-11-09 RX ADMIN — Medication 325 MILLIGRAM(S): at 06:14

## 2019-11-09 RX ADMIN — Medication 975 MILLIGRAM(S): at 10:20

## 2019-11-09 RX ADMIN — Medication 1 TABLET(S): at 12:53

## 2019-11-09 RX ADMIN — Medication 75 MICROGRAM(S): at 06:14

## 2019-11-09 RX ADMIN — Medication 500 MILLIGRAM(S): at 12:56

## 2019-11-09 RX ADMIN — SIMETHICONE 80 MILLIGRAM(S): 80 TABLET, CHEWABLE ORAL at 12:54

## 2019-11-09 RX ADMIN — Medication 325 MILLIGRAM(S): at 15:07

## 2019-11-09 RX ADMIN — Medication 600 MILLIGRAM(S): at 00:27

## 2019-11-09 RX ADMIN — Medication 975 MILLIGRAM(S): at 04:00

## 2019-11-09 RX ADMIN — Medication 975 MILLIGRAM(S): at 09:50

## 2019-11-09 NOTE — PROGRESS NOTE ADULT - SUBJECTIVE AND OBJECTIVE BOX
Postpartum Note,  Section  She is a  39y woman who is now post-operative day:3     Subjective:  The patient feels well.  She is ambulating.   She is tolerating regular diet.  She denies nausea and vomiting.  She is voiding.  Her pain is controlled.  She reports normal postpartum bleeding    Physical exam:    Vital Signs Last 24 Hrs  T(C): 36.4 (2019 05:36), Max: 37 (2019 14:09)  T(F): 97.6 (2019 05:36), Max: 98.6 (2019 14:09)  HR: 80 (2019 05:36) (80 - 99)  BP: 116/71 (2019 05:36) (111/68 - 119/63)  BP(mean): --  RR: 17 (2019 05:36) (17 - 17)  SpO2: 99% (2019 05:36) (99% - 100%)    Gen: NAD  Breast: Soft, nontender, not engorged.  Abdomen: Soft, nontender, no distension , firm uterine fundus at umbilicus.  Incision: Clean, dry, and intact with steri strips  Pelvic: Normal lochia noted  Ext: No calf tenderness    LABS:                  Allergies    No Known Allergies    Intolerances      MEDICATIONS  (STANDING):  acetaminophen   Tablet .. 975 milliGRAM(s) Oral every 6 hours  ascorbic acid 500 milliGRAM(s) Oral daily  diphtheria/tetanus/pertussis (acellular) Vaccine (ADAcel) 0.5 milliLiter(s) IntraMuscular once  enoxaparin Injectable 40 milliGRAM(s) SubCutaneous daily  ferrous    sulfate 325 milliGRAM(s) Oral three times a day  ibuprofen  Tablet. 600 milliGRAM(s) Oral every 6 hours  levothyroxine 75 MICROGram(s) Oral daily  prenatal multivitamin 1 Tablet(s) Oral daily  senna 1 Tablet(s) Oral two times a day    MEDICATIONS  (PRN):  diphenhydrAMINE 25 milliGRAM(s) Oral every 6 hours PRN Itching  glycerin Suppository - Adult 1 Suppository(s) Rectal at bedtime PRN Constipation  lanolin Ointment 1 Application(s) Topical every 6 hours PRN Sore Nipples  magnesium hydroxide Suspension 30 milliLiter(s) Oral two times a day PRN Constipation  metoclopramide Injectable 10 milliGRAM(s) IV Push once PRN Nausea and/or Vomiting  oxyCODONE    IR 5 milliGRAM(s) Oral once PRN Moderate to Severe Pain (4-10)  oxyCODONE    IR 5 milliGRAM(s) Oral every 3 hours PRN Moderate to Severe Pain (4-10)  simethicone 80 milliGRAM(s) Chew every 4 hours PRN Gas        Assessment and Plan  POD #3  s/p  section  Doing well.  Encourage ambulation.  D/C home instructions given  continue Lovenox 6 weeks 40 mg QD

## 2019-11-09 NOTE — PROGRESS NOTE ADULT - SUBJECTIVE AND OBJECTIVE BOX
Patient assessed at 0818.  Subjective  Pain: Patient denies any pain at the time of assessment. Pain being managed well by pain management protocol.  Complaints: None. Patient denies any headache, blur vision, dizziness and/or weakness/fatigue. Patient reports pain from varicose veins of legs bilaterally. Patient reports that she has had the varicose veins for 20years and has on supportive stocking today which has help revealed varicose veins pain.   Milestones:  Alert and orientedx3. Out of bed ambulating. Positive flatus. Positive bowel movement. Voiding.  Tolerating a regular diet.  Infant feeding: formula feeding  Feeding related issues and/or concerns: infant transferred from NICU to [facility] 37weeks now on phototherapy.     Vital Signs Last 24 Hrs  T(C): 36.4 (2019 05:36), Max: 37 (2019 14:09)  T(F): 97.6 (2019 05:36), Max: 98.6 (2019 14:09)  HR: 80 (2019 05:36) (80 - 99)  BP: 116/71 (2019 05:36) (111/68 - 119/63)  BP(mean): --  RR: 17 (2019 05:36) (17 - 17)  SpO2: 99% (2019 05:36) (99% - 100%)      Wt(kg): --    Labs:                      9.4    14.67 )-----------( 161      ( 2019 05:45 )             28.0     Blood Type: A Positive  RPR: Negative  Rubella: Immune      MEDICATIONS  (STANDING):  acetaminophen   Tablet .. 975 milliGRAM(s) Oral every 6 hours  ascorbic acid 500 milliGRAM(s) Oral daily  diphtheria/tetanus/pertussis (acellular) Vaccine (ADAcel) 0.5 milliLiter(s) IntraMuscular once  enoxaparin Injectable 40 milliGRAM(s) SubCutaneous daily  ferrous    sulfate 325 milliGRAM(s) Oral three times a day  ibuprofen  Tablet. 600 milliGRAM(s) Oral every 6 hours  levothyroxine 75 MICROGram(s) Oral daily  prenatal multivitamin 1 Tablet(s) Oral daily  senna 1 Tablet(s) Oral two times a day    MEDICATIONS  (PRN):  diphenhydrAMINE 25 milliGRAM(s) Oral every 6 hours PRN Itching  glycerin Suppository - Adult 1 Suppository(s) Rectal at bedtime PRN Constipation  lanolin Ointment 1 Application(s) Topical every 6 hours PRN Sore Nipples  magnesium hydroxide Suspension 30 milliLiter(s) Oral two times a day PRN Constipation  metoclopramide Injectable 10 milliGRAM(s) IV Push once PRN Nausea and/or Vomiting  oxyCODONE    IR 5 milliGRAM(s) Oral once PRN Moderate to Severe Pain (4-10)  oxyCODONE    IR 5 milliGRAM(s) Oral every 3 hours PRN Moderate to Severe Pain (4-10)  simethicone 80 milliGRAM(s) Chew every 4 hours PRN Gas          ASSESSMENT:  38y/o     Day#3   repeat post-operative  section delivery with bilateral tubal ligation.  Condition: Stable  Past Medical/Surgical/OB/GYN History significant for: antiphospholipid syndrome on lovenox, fibroid and polyp, varicose veins in lower extremities bilaterally, hypothyroidism on synthroid, benign breast biopsy,  section 2016, D&E at 19weeks 2017, SAB 2018  Current Issues: EBL 800cc  Lung sounds clear bilaterally.  Breasts: varela, nontender  Nipples: intact  Abdomen: Soft, nondistended and nontender. Bowel sounds present. Fundus firm  Abdominal incision: Clean, dry and intact with steri strips.   Vaginal: Lochia light rubra  Extremities: Slight edema noted bilaterally and equal with varicose veins throughout lower extremities bilaterally, nontender with no erythremic areas noted. Positive pedal pulses. No palpable veins noted  Other relevant physical exam findings: none    Plan  Continue routine post-operative and postpartum care  Increase ambulation, analgesia PRN and pain medication protocol of standing ibuprofen and acetaminophen and oxycodone prn  Encouraged to wear abdominal binder for support and to use incentive spirometer  Discussed breast/nipple care for a nonbreastfeeding mother.   Discharge to home on lovenox for 6weeks due to history of antiphospholipid syndrome.

## 2019-11-12 ENCOUNTER — APPOINTMENT (OUTPATIENT)
Dept: ANTEPARTUM | Facility: HOSPITAL | Age: 39
End: 2019-11-12

## 2019-11-12 ENCOUNTER — APPOINTMENT (OUTPATIENT)
Dept: ANTEPARTUM | Facility: CLINIC | Age: 39
End: 2019-11-12

## 2019-11-13 LAB — SURGICAL PATHOLOGY STUDY: SIGNIFICANT CHANGE UP

## 2019-11-15 DIAGNOSIS — O99.283 ENDOCRINE, NUTRITIONAL AND METABOLIC DISEASES COMPLICATING PREGNANCY, THIRD TRIMESTER: ICD-10-CM

## 2019-11-15 DIAGNOSIS — O09.523 SUPERVISION OF ELDERLY MULTIGRAVIDA, THIRD TRIMESTER: ICD-10-CM

## 2019-11-15 DIAGNOSIS — O09.293 SUPERVISION OF PREGNANCY WITH OTHER POOR REPRODUCTIVE OR OBSTETRIC HISTORY, THIRD TRIMESTER: ICD-10-CM

## 2019-12-03 DIAGNOSIS — O36.8130 DECREASED FETAL MOVEMENTS, THIRD TRIMESTER, NOT APPLICABLE OR UNSPECIFIED: ICD-10-CM

## 2019-12-03 DIAGNOSIS — O99.283 ENDOCRINE, NUTRITIONAL AND METABOLIC DISEASES COMPLICATING PREGNANCY, THIRD TRIMESTER: ICD-10-CM

## 2019-12-03 DIAGNOSIS — O41.93X0 DISORDER OF AMNIOTIC FLUID AND MEMBRANES, UNSPECIFIED, THIRD TRIMESTER, NOT APPLICABLE OR UNSPECIFIED: ICD-10-CM

## 2019-12-03 DIAGNOSIS — O09.523 SUPERVISION OF ELDERLY MULTIGRAVIDA, THIRD TRIMESTER: ICD-10-CM

## 2019-12-03 DIAGNOSIS — O09.293 SUPERVISION OF PREGNANCY WITH OTHER POOR REPRODUCTIVE OR OBSTETRIC HISTORY, THIRD TRIMESTER: ICD-10-CM

## 2019-12-03 DIAGNOSIS — O34.211 MATERNAL CARE FOR LOW TRANSVERSE SCAR FROM PREVIOUS CESAREAN DELIVERY: ICD-10-CM

## 2020-06-05 ENCOUNTER — APPOINTMENT (OUTPATIENT)
Dept: VASCULAR SURGERY | Facility: CLINIC | Age: 40
End: 2020-06-05

## 2020-06-09 ENCOUNTER — APPOINTMENT (OUTPATIENT)
Dept: VASCULAR SURGERY | Facility: CLINIC | Age: 40
End: 2020-06-09
Payer: COMMERCIAL

## 2020-06-09 VITALS
SYSTOLIC BLOOD PRESSURE: 122 MMHG | DIASTOLIC BLOOD PRESSURE: 79 MMHG | HEIGHT: 64 IN | WEIGHT: 160 LBS | TEMPERATURE: 98.7 F | HEART RATE: 67 BPM | BODY MASS INDEX: 27.31 KG/M2

## 2020-06-09 DIAGNOSIS — I83.819 VARICOSE VEINS OF UNSPECIFIED LOWER EXTREMITY WITH PAIN: ICD-10-CM

## 2020-06-09 PROCEDURE — 93970 EXTREMITY STUDY: CPT

## 2020-06-09 PROCEDURE — 99204 OFFICE O/P NEW MOD 45 MIN: CPT

## 2020-06-09 RX ORDER — AZITHROMYCIN 250 MG/1
250 TABLET, FILM COATED ORAL
Qty: 6 | Refills: 0 | Status: ACTIVE | COMMUNITY
Start: 2020-01-23

## 2020-06-09 RX ORDER — LEVOTHYROXINE SODIUM 0.07 MG/1
75 TABLET ORAL
Qty: 90 | Refills: 0 | Status: ACTIVE | COMMUNITY
Start: 2020-03-31

## 2020-06-25 ENCOUNTER — RESULT REVIEW (OUTPATIENT)
Age: 40
End: 2020-06-25

## 2020-06-26 NOTE — HISTORY OF PRESENT ILLNESS
[FreeTextEntry1] : pt reports sadie le rt worse than left discomfort and throbbing and pain intensity 8/10 slightly worse than the last OV   due  1st pregnancy june 24 2016 pt plans to undergo c section pt is compliant w comp stockings  [de-identified] : Pt c/o  bilateral leg pain swelling heaviness and discomfort worse w activity and by the end of the day\par Onset since last ov sev years ago \par Intensity mod to severe \par pt is compliant w comp stockings\par pt wants to discuss vein intervention options \par pt is on baby asa daily\par

## 2020-06-26 NOTE — DATA REVIEWED
[FreeTextEntry1] : 5/19/2015 Venous doppler dexter le no dvt svt  sig for rt gsv insuff prox thigh to knee\par \par 2/2/2016  Venous duplex  dexter le no dvt svt \par \par 5/6/2016 RLE Venous Duplex  no dvt svt  sig GSV insuff \par \par 6/9/2020 Venous Doppler Dexter LE  no acute dvt svt \par                            RLE GSV insuff from sfj to knee level  w insuff knee area  collaterals\par                            LLE LSV insuff from spj to distal calf level \par \par

## 2020-06-26 NOTE — PHYSICAL EXAM
[Normal Breath Sounds] : Normal breath sounds [1+] : left 1+ [2+] : left 2+ [Ankle Swelling (On Exam)] : present [Ankle Swelling Bilaterally] : bilaterally  [Varicose Veins Of Lower Extremities] : bilaterally [Ankle Swelling On The Right] : mild [] : bilaterally [Ankle Swelling On The Left] : moderate [No HSM] : no hepatosplenomegaly [No Rash or Lesion] : No rash or lesion [Alert] : alert [Oriented to Person] : oriented to person [Oriented to Place] : oriented to place [Oriented to Time] : oriented to time [Calm] : calm [Left Carotid Bruit] : no bruit heard over the left carotid [JVD] : no jugular venous distention  [Right Carotid Bruit] : no bruit heard over the right carotid [Stool Sample Taken] : No stool obtained  on rectal exam [Abdomen Masses] : No abdominal masses [Tender] : was nontender [de-identified] : wnl [de-identified] : nad [de-identified] : wnl [de-identified] : Dexter Cranial nerves 2-12 dexter grossly intact [de-identified] : wnl [FreeTextEntry1] : Moderate  bilateral leg venous insufficiency \par w mild to moderate bilateral leg stasis dermatitis,, hyperpigmentation in the gator area\par and moderate bilateral leg edema \par Multiple  bilateral leg medium and large and tortuous varicose  veins and spider veins  medial thigh right worse than the left  and calf and shin \par RLE Varicose veins measuring  4-5 mm in size on the thigh/calf /shin \par LLE Varicose veins measuring 4-5  mm in size on the thigh/calf /shin \par no wounds/ulcers\par  [de-identified] : cooperative

## 2020-06-26 NOTE — ASSESSMENT
[Arterial/Venous Disease] : arterial/venous disease [Other: _____] : [unfilled] [Foot care/Footwear] : foot care/footwear [FreeTextEntry1] : Impression venous insuff w sx \par \par \par Med Conserv management leg elevation, thigh high comp stockings 20-30mm Hg (rx given), wt loss, diet control\par Indications risks and benefits of vein RFA , sclerotherapy, stab phlebectomy were explained to pt who understands\par pt at this time wants to proceed w rle gsv rfa only and hold off on lsv rfa and rle stab phleb vs sclero\par rle photos taken \par \par VASCULAR SURGERY ATTENDING ADDENDUM 6/26/2020 \par c/w pt 6/19/2020\par pt at this time wants to also proceed w right leg stab phleb

## 2020-07-05 DIAGNOSIS — Z01.818 ENCOUNTER FOR OTHER PREPROCEDURAL EXAMINATION: ICD-10-CM

## 2020-07-06 ENCOUNTER — APPOINTMENT (OUTPATIENT)
Dept: DISASTER EMERGENCY | Facility: CLINIC | Age: 40
End: 2020-07-06

## 2020-07-06 LAB — SARS-COV-2 N GENE NPH QL NAA+PROBE: NOT DETECTED

## 2020-07-08 ENCOUNTER — APPOINTMENT (OUTPATIENT)
Dept: VASCULAR SURGERY | Facility: CLINIC | Age: 40
End: 2020-07-08
Payer: COMMERCIAL

## 2020-07-08 PROCEDURE — 36475 ENDOVENOUS RF 1ST VEIN: CPT | Mod: RT

## 2020-07-08 PROCEDURE — 37766 PHLEB VEINS - EXTREM 20+: CPT | Mod: RT

## 2020-07-08 NOTE — PROCEDURE
[FreeTextEntry1] : R GSV RFA with R Stab phlebectomy [FreeTextEntry3] : Procedural safety checklist and time out completed:\par Confirmed patient identification (Patient Name, , and/or medical record number including when possible affirmation by patient or parent/family/other).\par Confirmed procedure with the patient. Consent present, accurate and signed. \par Confirmed special equipment and supplies are present.\par Sterility confirmed. Position verified. \par Site/ side is marked and visible and confirmed. \par Procedure confirmed by consent. Accurate consent including side and site.\par Review of medical records, including venous ultrasound, noting correct procedure including site and side.\par MD/PA verifies presence and review of imaging studies and or written report of imaging studies.\par Agreement on the procedure to be performed\par Time out completed.\par All of the above has been confirmed by the team.\par All patient-specific concerns have been addressed. \par \par Indication: Right lower extremity varicose veins with  inflammation, leg pain, leg swelling, and leg cramping.  Venous insufficiency/ reflux.\par \par Procedure: radiofrequency ablation right saphenous vein and right leg stab phlebectomy.\par 	\par [Ms. CHRIS SANDOVAL is a 40 year old F with a history of right lower extremity varicose veins previously seen in the office.  Ultrasound examination demonstrated venous insufficiency. A trial of compression stockings, exercise, elevation, and pain medication was attempted without relief and definitive treatment with radiofrequency ablation was offered. Pre-procedure COVID PCR was negative\par \par I have discussed the risks of the procedure at length with the patient. The risks discussed were inclusive of but not limited to infection, irritation at the site of infiltration of local anesthesia, and also rare risk of deep venous thrombosis and pulmonary emboli. The patient agrees to proceed with the procedure. \par The patient was escorted into the procedure room and a time out called.\par The entire limb was prepped and draped in sterile fashion. The RF fiber was placed on the sterile field and connected by a sterile cable. Actuation, temperature, and impedance testing were performed to ensure that all components were connected and operating properly. \par The patient was placed on the procedure table and local anesthesia was instilled in the skin overlying the access site. Under ultrasound guidance, the vein was punctured with a micropuncture needle, using the anterior wall technique. A guide wire was now introduced through the needle, and the needle was then exchanged over the guide wire for a 7F sheath. The guide wire was removed and the RF probe was then placed into the  right greater saphenous vein through the sheath and position confirmed using ultrasound guidance. After the RF probe position was verified by ultrasound, tumescent anesthesia consisting of normal saline, 1% lidocaine with 8.4% sodium bicarbonate was infiltrated, under ultrasound guidance, precisely into the perivenous compartment along the entire length of the vein until a “halo” of fluid was noted around the vein. After RF probe position was again confirmed with ultrasound imaging, RF energy was applied. The probe was gradually and carefully withdrawn at a rate of 6.5cm/20seconds. Repeat ultrasound of the treated vein was performed confirming successful treatment. The catheter and sheath were withdrawn and hemostasis established with direct pressure\par \par The total volume injected was 300 cc. \par Total treatment time was 160seconds.\par Treatment length was 26.5 cm \par 8 cycles of RF performed using the 7 cm probe. \par The probe was 3.7 cm from the SFJ\par \par \par Local anesthesia using 40 cc 1% lidocaine was infiltrated using a 25 gauge needle over the previously marked prominent varicose vein sites.\par Multiple small stab incisions, each less than 1 cm in length was made at the noted sites. With the help of a vein hook, the vein was fished out at each of these sites, rolled over a narrow-tipped mosquito clamp and removed. Hemostasis was secured with leg elevation and application of manual pressure. After assuring hemostasis, a sterile 4x4 was placed on the access sites and an ACE compression wrap was applied. \par \par SIDE - RIGHT 	\par SITE - CALF /SHIN /  mid and distal THIGH\par LOCATION - MEDIAL / LATERAL / ANTERIOR 	\par \par Total Stab Incisions 38\par Estimated Blood Loss: minimal\par \par After assuring hemostasis, a sterile 4x4 was placed on the access site and an ACE compression wrap was applied. Patient tolerated procedure well. Patient was given post-procedure instructions and follow up appointment was scheduled.\par \par

## 2020-07-13 ENCOUNTER — APPOINTMENT (OUTPATIENT)
Dept: VASCULAR SURGERY | Facility: CLINIC | Age: 40
End: 2020-07-13
Payer: COMMERCIAL

## 2020-07-13 PROCEDURE — 93971 EXTREMITY STUDY: CPT

## 2020-08-05 ENCOUNTER — APPOINTMENT (OUTPATIENT)
Dept: VASCULAR SURGERY | Facility: CLINIC | Age: 40
End: 2020-08-05
Payer: COMMERCIAL

## 2020-08-05 PROCEDURE — 99212 OFFICE O/P EST SF 10 MIN: CPT | Mod: 95

## 2020-08-05 NOTE — PHYSICAL EXAM
[Ankle Swelling (On Exam)] : present [Varicose Veins Of Lower Extremities] : bilaterally [] : present [Ankle Swelling On The Left] : moderate [Alert] : alert [Oriented to Place] : oriented to place [Oriented to Time] : oriented to time [Oriented to Person] : oriented to person [Calm] : calm [Ankle Swelling On The Right] : mild [de-identified] : no resp distress [de-identified] : wnl [de-identified] : nad [FreeTextEntry1] : Moderate  bilateral leg venous insufficiency \par w mild to moderate bilateral leg stasis dermatitis, hyperpigmentation in the gator area\par and moderate bilateral leg edema \par Multiple  bilateral leg medium and large and tortuous varicose  veins and spider veins  medial thigh right worse than the left  and calf and shin \par RLE  all incisions healing well \par LLE Varicose veins measuring 4-5  mm in size on the thigh/calf /shin \par no wounds/ulcers\par Physical exam and extremities exam findings via telehealth video review\par  [de-identified] : cooperative [de-identified] : Dexter Cranial nerves 2-12 dexter grossly intact

## 2020-08-05 NOTE — REASON FOR VISIT
[Home] : at home, [unfilled] , at the time of the visit. [Medical Office: ([facility])___] : at the medical office located in  [Other:____] : [unfilled] [Verbal consent obtained from patient] : the patient, [unfilled] [Follow-Up: _____] : a [unfilled] follow-up visit [FreeTextEntry1] : My legs bother me

## 2020-08-05 NOTE — ASSESSMENT
[Arterial/Venous Disease] : arterial/venous disease [Other: _____] : [unfilled] [Foot care/Footwear] : foot care/footwear [FreeTextEntry1] : Impression venous insuff w sx  w clinical improvement maame rle \par \par \par Med Conserv management leg elevation, thigh high comp stockings 20-30mm Hg, wt loss, diet control\par Indications risks and benefits of vein RFA  were explained to pt who understands\par i recommended to be re eval and to d/w pt any additional vein interventions when she f/u in the office \par f/u for lle vein intervention in sept 2020 \par Telehealth visit  time duration  9  min\par

## 2020-08-05 NOTE — HISTORY OF PRESENT ILLNESS
[de-identified] : Pt c/o  bilateral leg pain swelling heaviness and discomfort has improved  from last ov\par pt states right leg incisions are  healing and the right leg is feeling better\par pt is donnell for lle intervention in sept 2020\par pt wants to discuss additional venous intervention \par pt is compliant w comp stockings \par  [FreeTextEntry1] : pt reports sadie le rt worse than left discomfort and throbbing and pain intensity 8/10 slightly worse than the last OV   due  1st pregnancy june 24 2016 pt plans to undergo c section pt is compliant w comp stockings

## 2020-09-16 RX ORDER — SODIUM BICARBONATE 84 MG/ML
8.4 INJECTION, SOLUTION INTRAVENOUS
Qty: 5 | Refills: 0 | Status: COMPLETED | COMMUNITY
Start: 2020-09-16 | End: 2020-09-23

## 2020-09-16 RX ORDER — LIDOCAINE HYDROCHLORIDE 10 MG/ML
1 INJECTION, SOLUTION INFILTRATION; PERINEURAL
Qty: 50 | Refills: 0 | Status: COMPLETED | COMMUNITY
Start: 2020-09-16 | End: 2020-09-23

## 2020-09-20 ENCOUNTER — APPOINTMENT (OUTPATIENT)
Dept: DISASTER EMERGENCY | Facility: CLINIC | Age: 40
End: 2020-09-20

## 2020-09-20 LAB — SARS-COV-2 N GENE NPH QL NAA+PROBE: NOT DETECTED

## 2020-09-23 ENCOUNTER — APPOINTMENT (OUTPATIENT)
Dept: VASCULAR SURGERY | Facility: CLINIC | Age: 40
End: 2020-09-23
Payer: COMMERCIAL

## 2020-09-23 PROCEDURE — 36475 ENDOVENOUS RF 1ST VEIN: CPT | Mod: LT

## 2020-09-23 NOTE — PROCEDURE
[FreeTextEntry1] : left SSV RFA [FreeTextEntry3] : Procedural safety checklist and time out completed:\par Confirmed patient identification (Patient Name, , and/or medical record number including when possible affirmation by patient or parent/family/other).\par Confirmed procedure with the patient. Consent present, accurate and signed. \par Confirmed special equipment and supplies are present.\par Sterility confirmed. Position verified. \par Site/ side is marked and visible and confirmed. \par Procedure confirmed by consent. Accurate consent including side and site.\par Review of medical records, including venous ultrasound, noting correct procedure including site and side.\par MD/PA verifies presence and review of imaging studies and or written report of imaging studies.\par Agreement on the procedure to be performed\par Time out completed.\par All of the above has been confirmed by the team.\par All patient-specific concerns have been addressed. \par \par Indication: Left lower extremity varicose veins with inflammation, leg pain, leg swelling, and leg cramping.  Venous insufficiency/ reflux.\par \par Procedure: radiofrequency ablation of the left small saphenous vein. \par 	\par Ms. CHRIS SANDOVAL is a 40 year old F with a history of left lower extremity varicose veins previously seen in the office.  Ultrasound examination demonstrated venous insufficiency. A trial of compression stockings, exercise, elevation, and pain medication was attempted without relief and definitive treatment with radiofrequency ablation was offered. \par \par The patient has come for radiofrequency ablation treatment of the left small saphenous vein. Pre-procedure COVID PCR test was negative.\par I have discussed the risks of the procedure at length with the patient. The risks discussed were inclusive of but not limited to infection, irritation at the site of infiltration of local anesthesia, and also rare risk of deep venous thrombosis and pulmonary emboli. The patient agrees to proceed with the procedure. \par The patient was escorted into the procedure room and a time out called.\par The entire limb was prepped and draped in sterile fashion. The RF fiber was placed on the sterile field and connected by a sterile cable. Actuation, temperature, and impedance testing were performed to ensure that all components were connected and operating properly. \par The patient was placed on the procedure table and local anesthesia was instilled in the skin overlying the access site. Under ultrasound guidance, the vein was punctured with a micropuncture needle, using the anterior wall technique. A guide wire was now introduced through the needle, and the needle was then exchanged over the guide wire for a 7F sheath. The guide wire was removed and the RF probe was then placed into the left small saphenous vein through the sheath and position confirmed using ultrasound guidance. After the RF probe position was verified by ultrasound, tumescent anesthesia consisting of normal saline, 1% lidocaine with 8.4% sodium bicarbonate was infiltrated, under ultrasound guidance, precisely into the perivenous compartment along the entire length of the vein until a “halo” of fluid was noted around the vein. After RF probe position was again confirmed with ultrasound imaging, RF energy was applied. The probe was gradually and carefully withdrawn at a rate of 6.5cm/20seconds. \par \par 6 cycles of RF performed using the 7 cm probe\par Total treatment time was 120 seconds.\par The total volume injected was 400 cc\par Treatment length was 20 cm and \par The probe is > 3.2 cm from the SPJ.\par \par Estimated Blood Loss: minimal\par Repeat ultrasound of the treated vein was performed confirming successful treatment. The catheter and sheath were withdrawn and hemostasis established with direct pressure. After assuring hemostasis, a sterile 4x4 was placed on the access site and an ACE compression wrap was applied. Patient tolerated procedure well. Patient was given post-procedure instructions and follow up appointment was scheduled.\par \par \par

## 2020-09-28 ENCOUNTER — APPOINTMENT (OUTPATIENT)
Dept: VASCULAR SURGERY | Facility: CLINIC | Age: 40
End: 2020-09-28
Payer: COMMERCIAL

## 2020-09-28 ENCOUNTER — APPOINTMENT (OUTPATIENT)
Dept: SURGERY | Facility: CLINIC | Age: 40
End: 2020-09-28
Payer: COMMERCIAL

## 2020-09-28 VITALS
SYSTOLIC BLOOD PRESSURE: 120 MMHG | HEART RATE: 53 BPM | DIASTOLIC BLOOD PRESSURE: 79 MMHG | TEMPERATURE: 97.9 F | WEIGHT: 155 LBS | HEIGHT: 64 IN | BODY MASS INDEX: 26.46 KG/M2

## 2020-09-28 DIAGNOSIS — K43.9 VENTRAL HERNIA W/OUT OBSTRUCTION OR GANGRENE: ICD-10-CM

## 2020-09-28 PROCEDURE — 99243 OFF/OP CNSLTJ NEW/EST LOW 30: CPT

## 2020-09-28 PROCEDURE — 93971 EXTREMITY STUDY: CPT

## 2020-09-28 NOTE — PHYSICAL EXAM
[Respiratory Effort] : normal respiratory effort [Alert] : alert [Oriented to Person] : oriented to person [Oriented to Place] : oriented to place [Oriented to Time] : oriented to time [Calm] : calm [JVD] : no jugular venous distention  [Abdominal Masses] : No abdominal masses [Abdomen Tenderness] : ~T ~M No abdominal tenderness [de-identified] : [facility] NAD [de-identified] : EOMI [de-identified] : soft NT ND + ventral and umbilical hernias as well as mild diastasis recti.

## 2020-09-28 NOTE — ASSESSMENT
[FreeTextEntry1] : 41 yo female with small ventral and umbilical hernias. will schedule out patient repair in the near future.

## 2020-09-28 NOTE — HISTORY OF PRESENT ILLNESS
[de-identified] : 41 yo female with h/o lupus anticoagulant,  presents with complaints of abdominal wall bulge which she states she developed after her last pregnancy. Patient denies any nausea or vomiting. No h/o hernia repair in the past. She states some discomfort over the bulge and states it gets larger after meals.

## 2020-11-04 ENCOUNTER — APPOINTMENT (OUTPATIENT)
Dept: VASCULAR SURGERY | Facility: CLINIC | Age: 40
End: 2020-11-04
Payer: COMMERCIAL

## 2020-11-04 PROCEDURE — 99442: CPT

## 2020-11-04 NOTE — REASON FOR VISIT
[Home] : at home, [unfilled] , at the time of the visit. [Medical Office: ([facility])___] : at the medical office located in  [Other:____] : [unfilled] [Verbal consent obtained from patient] : the patient, [unfilled] [Follow-Up: _____] : a [unfilled] follow-up visit [FreeTextEntry1] : My legs bother me

## 2020-11-04 NOTE — PHYSICAL EXAM
[Alert] : alert [Oriented to Person] : oriented to person [Oriented to Place] : oriented to place [Oriented to Time] : oriented to time [Calm] : calm [de-identified] : no resp distress [FreeTextEntry1] : Physical exam findings via telephonic review with patient \par \par \par Multiple  bilateral leg medium and large and tortuous varicose  veins and spider veins  medial thigh right worse than the left  and calf and shin \par RLE  all incisions healing well \par LLE Varicose veins measuring 4-5  mm in size on the thigh/calf /shin \par no wounds/ulcers\par Physical exam and extremities exam findings via telehealth video review\par  [de-identified] : cooperative

## 2020-11-04 NOTE — ASSESSMENT
[FreeTextEntry1] : Impression venous insuff w sx  w clinical improvement\par \par \par Med Conserv management leg elevation, thigh high comp stockings 20-30mm Hg, wt loss, diet control\par ov in feb 2021 to re eval le venous insuff condition \par Telehealth visit  time duration 11 min\par  [Arterial/Venous Disease] : arterial/venous disease [Other: _____] : [unfilled] [Foot care/Footwear] : foot care/footwear

## 2020-11-04 NOTE — HISTORY OF PRESENT ILLNESS
[FreeTextEntry1] : pt reports sadie le rt worse than left discomfort and throbbing and pain intensity 8/10 slightly worse than the last OV   due  1st pregnancy june 24 2016 pt plans to undergo c section pt is compliant w comp stockings  [de-identified] : Pt states both le are feeling much better  following all interventions \par pt states only mild discomfort\par pt is compliant w comp stockings \par pt states no le wounds

## 2020-11-23 NOTE — PACU DISCHARGE NOTE - MENTAL STATUS: PATIENT PARTICIPATION
Clinic hours for Dr. Quigley:  Monday 7 am - 5 pm  Tuesday 7 am - 5 pm  Wednesday 7 am - 5 pm  Thursday 7 am - 5 pm  Friday  7 am - 4 pm    If you need a refill on your prescription please call your pharmacy and let them know. Please be proactive and call before your medication runs out. The pharmacy will then contact us for the refill. Please allow 24-48 hours for the refill to be processed.     If your physician has ordered additional laboratory or radiology testing as part of your ongoing plan of care, please allow 7-10 business days from the day of your lab draw or test for the results to be sent and reviewed by your provider. If your results are critical and require more immediate intervention, you will be contacted sooner. Your results will be conveyed to you via a phone call or letter.    You may be receiving a patient satisfaction survey in the mail. Please take the time to complete, as your feedback is very important to us. We strive to make your experience exceptional and your comments help us with that goal. We look forward to hearing from you.       Awake

## 2020-12-31 ENCOUNTER — OUTPATIENT (OUTPATIENT)
Dept: OUTPATIENT SERVICES | Facility: HOSPITAL | Age: 40
LOS: 1 days | Discharge: HOME | End: 2020-12-31

## 2020-12-31 VITALS
OXYGEN SATURATION: 97 % | WEIGHT: 154.76 LBS | RESPIRATION RATE: 18 BRPM | TEMPERATURE: 99 F | DIASTOLIC BLOOD PRESSURE: 75 MMHG | HEART RATE: 79 BPM | HEIGHT: 65 IN | SYSTOLIC BLOOD PRESSURE: 130 MMHG

## 2020-12-31 DIAGNOSIS — K43.2 INCISIONAL HERNIA WITHOUT OBSTRUCTION OR GANGRENE: ICD-10-CM

## 2020-12-31 DIAGNOSIS — K42.9 UMBILICAL HERNIA WITHOUT OBSTRUCTION OR GANGRENE: ICD-10-CM

## 2020-12-31 DIAGNOSIS — K43.9 VENTRAL HERNIA WITHOUT OBSTRUCTION OR GANGRENE: ICD-10-CM

## 2020-12-31 DIAGNOSIS — Z98.89 OTHER SPECIFIED POSTPROCEDURAL STATES: Chronic | ICD-10-CM

## 2020-12-31 DIAGNOSIS — O09.299 SUPERVISION OF PREGNANCY WITH OTHER POOR REPRODUCTIVE OR OBSTETRIC HISTORY, UNSPECIFIED TRIMESTER: Chronic | ICD-10-CM

## 2020-12-31 DIAGNOSIS — Z01.818 ENCOUNTER FOR OTHER PREPROCEDURAL EXAMINATION: ICD-10-CM

## 2020-12-31 DIAGNOSIS — Z98.891 HISTORY OF UTERINE SCAR FROM PREVIOUS SURGERY: Chronic | ICD-10-CM

## 2020-12-31 LAB
ANION GAP SERPL CALC-SCNC: 7 MMOL/L — SIGNIFICANT CHANGE UP (ref 5–17)
APTT BLD: 36.8 SEC — HIGH (ref 27.5–35.5)
BUN SERPL-MCNC: 13 MG/DL — SIGNIFICANT CHANGE UP (ref 7–23)
CALCIUM SERPL-MCNC: 9.3 MG/DL — SIGNIFICANT CHANGE UP (ref 8.5–10.1)
CHLORIDE SERPL-SCNC: 105 MMOL/L — SIGNIFICANT CHANGE UP (ref 96–108)
CO2 SERPL-SCNC: 25 MMOL/L — SIGNIFICANT CHANGE UP (ref 22–31)
CREAT SERPL-MCNC: 0.74 MG/DL — SIGNIFICANT CHANGE UP (ref 0.5–1.3)
GLUCOSE SERPL-MCNC: 95 MG/DL — SIGNIFICANT CHANGE UP (ref 70–99)
HCG UR QL: NEGATIVE — SIGNIFICANT CHANGE UP
HCT VFR BLD CALC: 39.1 % — SIGNIFICANT CHANGE UP (ref 34.5–45)
HGB BLD-MCNC: 13 G/DL — SIGNIFICANT CHANGE UP (ref 11.5–15.5)
INR BLD: 0.96 RATIO — SIGNIFICANT CHANGE UP (ref 0.88–1.16)
MCHC RBC-ENTMCNC: 30.2 PG — SIGNIFICANT CHANGE UP (ref 27–34)
MCHC RBC-ENTMCNC: 33.2 GM/DL — SIGNIFICANT CHANGE UP (ref 32–36)
MCV RBC AUTO: 90.7 FL — SIGNIFICANT CHANGE UP (ref 80–100)
NRBC # BLD: 0 /100 WBCS — SIGNIFICANT CHANGE UP (ref 0–0)
PLATELET # BLD AUTO: 295 K/UL — SIGNIFICANT CHANGE UP (ref 150–400)
POTASSIUM SERPL-MCNC: 4 MMOL/L — SIGNIFICANT CHANGE UP (ref 3.5–5.3)
POTASSIUM SERPL-SCNC: 4 MMOL/L — SIGNIFICANT CHANGE UP (ref 3.5–5.3)
PROTHROM AB SERPL-ACNC: 11.2 SEC — SIGNIFICANT CHANGE UP (ref 10.6–13.6)
RBC # BLD: 4.31 M/UL — SIGNIFICANT CHANGE UP (ref 3.8–5.2)
RBC # FLD: 12.6 % — SIGNIFICANT CHANGE UP (ref 10.3–14.5)
SODIUM SERPL-SCNC: 137 MMOL/L — SIGNIFICANT CHANGE UP (ref 135–145)
WBC # BLD: 6.07 K/UL — SIGNIFICANT CHANGE UP (ref 3.8–10.5)
WBC # FLD AUTO: 6.07 K/UL — SIGNIFICANT CHANGE UP (ref 3.8–10.5)

## 2020-12-31 NOTE — H&P PST ADULT - NSICDXPASTSURGICALHX_GEN_ALL_CORE_FT
PAST SURGICAL HISTORY:  H/O benign breast biopsy     H/O dilation and curettage x3 in , x1 in     H/O miscarriage, currently pregnant s/p D & E at 19 weeks, s/p D & C at 9.5 weeks    H/O:  section 2016

## 2020-12-31 NOTE — H&P PST ADULT - HISTORY OF PRESENT ILLNESS
Pt is a 41 yo female w/ PMH of <>.  Pt is scheduled for a hernia repair on 1/14/2021 with Dr. Stone.    Patient denies fever, chills, SOB, recent travel, and recent sick contacts. Pt is a 39 yo female w/ PMH of who presents hernia with abdominal pain, constipation, diarrhea.  Denies nausea or vomiting.  Pt is scheduled for a hernia repair on 1/14/2021 with Dr. Stone.    Patient denies fever, chills, SOB, recent travel, and recent sick contacts. Pt is a 41 yo female w/ PMH of hypothyroidism, antiphospholipid antibody syn, venous insuffiencey complex hyperplasia who presents hernia with abdominal pain, constipation, diarrhea.  Denies nausea or vomiting.  Pt is scheduled for a hernia repair on 1/14/2021 with Dr. Stone.    Patient denies fever, chills, SOB, recent travel, and recent sick contacts. Pt is a 39 yo female w/ PMH of hypothyroidism, antiphospholipid antibody syn, venous insuffiencey complex hyperplasia who presents hernia with abdominal pain, constipation, diarrhea.  Denies nausea or vomiting.  Pt is scheduled for a hernia repair on 1/7/2021 with Dr. Stone.    Patient denies fever, chills, SOB, recent travel, and recent sick contacts.

## 2020-12-31 NOTE — H&P PST ADULT - NSICDXPROBLEM_GEN_ALL_CORE_FT
PROBLEM DIAGNOSES  Problem: Preop examination  Assessment and Plan: Assessment and Plan: labs - CBC, BMP, PT/INR,  aPTT, urine pregnancy.  Preop 1 day Hibiclens instruction reviewed and given.  Patient instructed of NPO status and to take routine meds DOS with sips of water.   Avoid NSAIDs, aspirin, herbal products, OTC supplements.  Patient verbalized understanding.  Information on proper nutrition, increase protein and better food choices provided in packet.  Patient to go for COVID swabbing at 3 days prior to surgery.  List of location and contact information provided.       PROBLEM DIAGNOSES  Problem: Ventral incisional hernia  Assessment and Plan: Pt is scheduled for a hernia repair on 1/7/2021 with Dr. Stone.    Problem: Umbilical hernia  Assessment and Plan: Pt is scheduled for a hernia repair on 1/7/2021 with Dr. Stone.    Problem: Preop examination  Assessment and Plan:  labs - CBC, BMP, PT/INR,  aPTT, urine pregnancy.  Preop 1 day Hibiclens instruction reviewed and given.  Patient instructed of NPO status and to take routine meds DOS with sips of water.   Avoid NSAIDs, aspirin, herbal products, OTC supplements.  Patient verbalized understanding.  Information on proper nutrition, increase protein and better food choices provided in packet.  Patient to go for COVID swabbing at 3 days prior to surgery.  Pt has appt on 1/4/21 for swab.

## 2020-12-31 NOTE — H&P PST ADULT - ASSESSMENT
Preoperative examination  Assessment and Plan: labs - CBC, BMP, PT/INR,  aPTT, urine pregnancy.  Preop 1 day Hibiclens instruction reviewed and given.  Patient instructed of NPO status and to take routine meds DOS with sips of water.   Avoid NSAIDs, aspirin, herbal products, OTC supplements.  Patient verbalized understanding.  Information on proper nutrition, increase protein and better food choices provided in packet.  Patient to go for COVID swabbing at 3 days prior to surgery.  List of location and contact information provided. Pt is a 41 yo female w/ PMH of hypothyroidism, antiphospholipid antibody syn, venous insuffiencey complex hyperplasia who presents hernia with abdominal pain, constipation, diarrhea.  Denies nausea or vomiting.  Pt is scheduled for a hernia repair on 2021 with Dr. Stone.    Assessment: Umbilical hernia, ventral hernia  Plan: Pt is scheduled for a hernia repair on 2021 with Dr. Stone.    CAPRINI SCORE [CLOT]    AGE RELATED RISK FACTORS                                                       MOBILITY RELATED FACTORS  [x ] Age 41-60 years                                            (1 Point)                  [ ] Bed rest                                                        (1 Point)  [ ] Age: 61-74 years                                           (2 Points)                 [ ] Plaster cast                                                   (2 Points)  [ ] Age= 75 years                                              (3 Points)                 [ ] Bed bound for more than 72 hours                 (2 Points)    DISEASE RELATED RISK FACTORS                                               GENDER SPECIFIC FACTORS  [ ] Edema in the lower extremities                       (1 Point)                  [ ] Pregnancy                                                     (1 Point)  [ ] Varicose veins                                               (1 Point)                  [ ] Post-partum < 6 weeks                                   (1 Point)             [x ] BMI > 25 Kg/m2                                            (1 Point)                  [ ] Hormonal therapy  or oral contraception          (1 Point)                 [ ] Sepsis (in the previous month)                        (1 Point)                  [ ] History of pregnancy complications                 (1 point)  [ ] Pneumonia or serious lung disease                                               [ ] Unexplained or recurrent                     (1 Point)           (in the previous month)                               (1 Point)  [ ] Abnormal pulmonary function test                     (1 Point)                 SURGERY RELATED RISK FACTORS  [ ] Acute myocardial infarction                              (1 Point)                 [ ]  Section                                             (1 Point)  [ ] Congestive heart failure (in the previous month)  (1 Point)               [x ] Minor surgery                                                  (1 Point)   [ ] Inflammatory bowel disease                             (1 Point)                 [ ] Arthroscopic surgery                                        (2 Points)  [ ] Central venous access                                      (2 Points)                [ ] General surgery lasting more than 45 minutes   (2 Points)       [ ] Stroke (in the previous month)                          (5 Points)               [ ] Elective arthroplasty                                         (5 Points)                                                                                                                                               HEMATOLOGY RELATED FACTORS                                                 TRAUMA RELATED RISK FACTORS  [ ] Prior episodes of VTE                                     (3 Points)                [ ] Fracture of the hip, pelvis, or leg                       (5 Points)  [ ] Positive family history for VTE                         (3 Points)                 [ ] Acute spinal cord injury (in the previous month)  (5 Points)  [ ] Prothrombin 60140 A                                     (3 Points)                 [ ] Paralysis  (less than 1 month)                             (5 Points)  [ ] Factor V Leiden                                             (3 Points)                  [ ] Multiple Trauma within 1 month                        (5 Points)  [ ] Lupus anticoagulants                                     (3 Points)                                                           [ ] Anticardiolipin antibodies                               (3 Points)                                                       [ ] High homocysteine in the blood                      (3 Points)                                             [ ] Other congenital or acquired thrombophilia      (3 Points)                                                [ ] Heparin induced thrombocytopenia                  (3 Points)                                          Total Score [   3    ]    Caprini Score 0 - 2:  Low Risk, No VTE Prophylaxis required for most patients, encourage ambulation  Caprini Score 3 - 6:  At Risk, pharmacologic VTE prophylaxis is indicated for most patients (in the absence of a contraindication)  Caprini Score Greater than or = 7:  High Risk, pharmacologic VTE prophylaxis is indicated for most patients (in the absence of a contraindication)    Preoperative examination  Assessment and Plan: labs - CBC, BMP, PT/INR,  aPTT, urine pregnancy.  Preop 1 day Hibiclens instruction reviewed and given.  Patient instructed of NPO status and to take routine meds DOS with sips of water.   Avoid NSAIDs, aspirin, herbal products, OTC supplements.  Patient verbalized understanding.  Information on proper nutrition, increase protein and better food choices provided in packet.  Patient to go for COVID swabbing at 3 days prior to surgery.  Pt has appt on 21 for swab.

## 2021-01-04 ENCOUNTER — APPOINTMENT (OUTPATIENT)
Dept: DISASTER EMERGENCY | Facility: CLINIC | Age: 41
End: 2021-01-04

## 2021-01-05 LAB — SARS-COV-2 N GENE NPH QL NAA+PROBE: NOT DETECTED

## 2021-01-06 ENCOUNTER — TRANSCRIPTION ENCOUNTER (OUTPATIENT)
Age: 41
End: 2021-01-06

## 2021-01-07 ENCOUNTER — APPOINTMENT (OUTPATIENT)
Dept: SURGERY | Facility: HOSPITAL | Age: 41
End: 2021-01-07

## 2021-01-07 ENCOUNTER — OUTPATIENT (OUTPATIENT)
Dept: OUTPATIENT SERVICES | Facility: HOSPITAL | Age: 41
LOS: 1 days | Discharge: HOME | End: 2021-01-07
Payer: COMMERCIAL

## 2021-01-07 VITALS
HEART RATE: 61 BPM | OXYGEN SATURATION: 96 % | SYSTOLIC BLOOD PRESSURE: 114 MMHG | RESPIRATION RATE: 16 BRPM | DIASTOLIC BLOOD PRESSURE: 69 MMHG

## 2021-01-07 VITALS
OXYGEN SATURATION: 99 % | HEART RATE: 85 BPM | WEIGHT: 154.1 LBS | TEMPERATURE: 98 F | DIASTOLIC BLOOD PRESSURE: 72 MMHG | HEIGHT: 65 IN | SYSTOLIC BLOOD PRESSURE: 112 MMHG | RESPIRATION RATE: 18 BRPM

## 2021-01-07 DIAGNOSIS — Z98.891 HISTORY OF UTERINE SCAR FROM PREVIOUS SURGERY: Chronic | ICD-10-CM

## 2021-01-07 DIAGNOSIS — Z98.51 TUBAL LIGATION STATUS: Chronic | ICD-10-CM

## 2021-01-07 DIAGNOSIS — O09.299 SUPERVISION OF PREGNANCY WITH OTHER POOR REPRODUCTIVE OR OBSTETRIC HISTORY, UNSPECIFIED TRIMESTER: Chronic | ICD-10-CM

## 2021-01-07 DIAGNOSIS — Z98.89 OTHER SPECIFIED POSTPROCEDURAL STATES: Chronic | ICD-10-CM

## 2021-01-07 PROCEDURE — 49560: CPT | Mod: AS

## 2021-01-07 PROCEDURE — 49585: CPT | Mod: AS

## 2021-01-07 PROCEDURE — 49560: CPT

## 2021-01-07 PROCEDURE — 49585: CPT

## 2021-01-07 RX ORDER — FENTANYL CITRATE 50 UG/ML
25 INJECTION INTRAVENOUS
Refills: 0 | Status: DISCONTINUED | OUTPATIENT
Start: 2021-01-07 | End: 2021-01-08

## 2021-01-07 RX ORDER — SODIUM CHLORIDE 9 MG/ML
1000 INJECTION, SOLUTION INTRAVENOUS
Refills: 0 | Status: DISCONTINUED | OUTPATIENT
Start: 2021-01-07 | End: 2021-01-08

## 2021-01-07 RX ORDER — ONDANSETRON 8 MG/1
4 TABLET, FILM COATED ORAL ONCE
Refills: 0 | Status: COMPLETED | OUTPATIENT
Start: 2021-01-07 | End: 2021-01-07

## 2021-01-07 RX ADMIN — ONDANSETRON 4 MILLIGRAM(S): 8 TABLET, FILM COATED ORAL at 09:47

## 2021-01-07 RX ADMIN — SODIUM CHLORIDE 75 MILLILITER(S): 9 INJECTION, SOLUTION INTRAVENOUS at 09:47

## 2021-01-07 RX ADMIN — FENTANYL CITRATE 25 MICROGRAM(S): 50 INJECTION INTRAVENOUS at 09:42

## 2021-01-07 NOTE — ASU PATIENT PROFILE, ADULT - PSH
H/O benign breast biopsy    H/O dilation and curettage  x3 in , x1 in   H/O miscarriage, currently pregnant  s/p D & E at 19 weeks, s/p D & C at 9.5 weeks  H/O tubal ligation    H/O:  section

## 2021-01-08 DIAGNOSIS — G89.18 OTHER ACUTE POSTPROCEDURAL PAIN: ICD-10-CM

## 2021-01-08 RX ORDER — OXYCODONE AND ACETAMINOPHEN 5; 325 MG/1; MG/1
5-325 TABLET ORAL
Qty: 15 | Refills: 0 | Status: ACTIVE | COMMUNITY
Start: 2021-01-08 | End: 1900-01-01

## 2021-01-08 RX ORDER — OXYCODONE AND ACETAMINOPHEN 5; 325 MG/1; MG/1
5-325 TABLET ORAL
Qty: 10 | Refills: 0 | Status: ACTIVE | COMMUNITY
Start: 2021-01-08 | End: 1900-01-01

## 2021-01-12 DIAGNOSIS — E03.9 HYPOTHYROIDISM, UNSPECIFIED: ICD-10-CM

## 2021-01-12 DIAGNOSIS — I87.2 VENOUS INSUFFICIENCY (CHRONIC) (PERIPHERAL): ICD-10-CM

## 2021-01-12 DIAGNOSIS — K43.9 VENTRAL HERNIA WITHOUT OBSTRUCTION OR GANGRENE: ICD-10-CM

## 2021-01-12 DIAGNOSIS — Z79.82 LONG TERM (CURRENT) USE OF ASPIRIN: ICD-10-CM

## 2021-01-12 DIAGNOSIS — D68.61 ANTIPHOSPHOLIPID SYNDROME: ICD-10-CM

## 2021-01-12 DIAGNOSIS — K42.9 UMBILICAL HERNIA WITHOUT OBSTRUCTION OR GANGRENE: ICD-10-CM

## 2021-01-12 DIAGNOSIS — M62.08 SEPARATION OF MUSCLE (NONTRAUMATIC), OTHER SITE: ICD-10-CM

## 2021-01-12 DIAGNOSIS — R19.4 CHANGE IN BOWEL HABIT: ICD-10-CM

## 2021-01-13 ENCOUNTER — APPOINTMENT (OUTPATIENT)
Dept: SURGERY | Facility: CLINIC | Age: 41
End: 2021-01-13
Payer: COMMERCIAL

## 2021-01-13 VITALS
HEART RATE: 69 BPM | HEIGHT: 64 IN | SYSTOLIC BLOOD PRESSURE: 115 MMHG | WEIGHT: 155 LBS | DIASTOLIC BLOOD PRESSURE: 87 MMHG | BODY MASS INDEX: 26.46 KG/M2 | TEMPERATURE: 97.5 F | OXYGEN SATURATION: 98 %

## 2021-01-13 PROCEDURE — 99024 POSTOP FOLLOW-UP VISIT: CPT

## 2021-02-08 ENCOUNTER — APPOINTMENT (OUTPATIENT)
Dept: SURGERY | Facility: CLINIC | Age: 41
End: 2021-02-08
Payer: COMMERCIAL

## 2021-02-08 VITALS
HEART RATE: 80 BPM | BODY MASS INDEX: 25.66 KG/M2 | SYSTOLIC BLOOD PRESSURE: 123 MMHG | DIASTOLIC BLOOD PRESSURE: 77 MMHG | WEIGHT: 154 LBS | HEIGHT: 65 IN

## 2021-02-08 PROCEDURE — 99024 POSTOP FOLLOW-UP VISIT: CPT

## 2021-02-23 ENCOUNTER — APPOINTMENT (OUTPATIENT)
Dept: VASCULAR SURGERY | Facility: CLINIC | Age: 41
End: 2021-02-23
Payer: COMMERCIAL

## 2021-02-23 PROCEDURE — 99442: CPT

## 2021-02-23 PROCEDURE — 93970 EXTREMITY STUDY: CPT

## 2021-02-23 PROCEDURE — 99072 ADDL SUPL MATRL&STAF TM PHE: CPT

## 2021-02-23 RX ORDER — MULTIVITAMIN
TABLET ORAL
Refills: 0 | Status: ACTIVE | COMMUNITY

## 2021-02-23 NOTE — DATA REVIEWED
[FreeTextEntry1] : 5/19/2015 Venous doppler dexter le no dvt svt  sig for rt gsv insuff prox thigh to knee\par \par 2/2/2016  Venous duplex  dexter le no dvt svt \par \par 5/6/2016 RLE Venous Duplex  no dvt svt  sig GSV insuff \par \par 6/9/2020 Venous Doppler Dexter LE  no acute dvt svt \par                            RLE GSV insuff from sfj to knee level  w insuff knee area  collaterals\par                            LLE LSV insuff from spj to distal calf level \par \par 2/23/2021 Venous Doppler Dexter LE  no acute dvt svt \par                            RLE LSV competent , GSV not viz\par                            LLE GSV insuff from sfj to knee level  \par \par \par \par

## 2021-02-23 NOTE — ASSESSMENT
[Arterial/Venous Disease] : arterial/venous disease [Other: _____] : [unfilled] [Foot care/Footwear] : foot care/footwear [FreeTextEntry1] : Impression venous insuff w sx  w clinical improvement on the rle and sx on the lle \par \par \par Med Conserv management leg elevation, thigh high comp stockings 20-30mm Hg, wt loss, diet control\par Indications risks and benefits of Left GSV RFA were explained to pt who understands\par pt wants to proceed\par Telephonic visit  time duration 12 min\par \par \par \par \par Varicose veins are enlarged, twisted veins. Varicose veins are caused by increased blood pressure in the veins.  The blood moves towards the heart by 1-way valves in the veins. When the valves become weakened or damaged, blood can collect in the veins and pool in your lower legs (ankles). This causes the veins to become enlarged and incompetent with reflux. Sitting or standing for long periods can cause blood to pool in the leg veins, increasing the pressure within the veins. \par Risk factors for varicose veins or venous disease may include:  obesity, older age, standing or sitting for prolonged periods of time for several years, being female, pregnancy, taking oral contraceptive pills or hormone replacement, being inactive, and/or smoking. \par The most common symptoms of varicose veins are sensations in the legs, such as a heavy feeling, burning, and/or aching. However, each individual may experience symptoms differently.  Other symptoms may include:  color changes in the skin, sores on the legs, or rash.  Severe varicose veins or venous disease may eventually produce long-term mild swelling that can result in more serious skin and tissue problems, such as ulcers and non-healing sores.\par Varicose veins and venous disease are diagnosed by a complete medical history, physical examination, and diagnostic studies for varicose veins including duplex ultrasound and color-flow imaging.  \par Medical treatment for varicose veins and venous disease include:  compression stockings, sclerotherapy, endovenous ablation and/or surgical treatment with microphlebectomy.  \par \par

## 2021-02-23 NOTE — PHYSICAL EXAM
[Alert] : alert [Oriented to Person] : oriented to person [Oriented to Place] : oriented to place [Oriented to Time] : oriented to time [Calm] : calm [de-identified] : no resp distress [FreeTextEntry1] : Physical exam findings via telephonic review with patient \par \par \par Multiple  bilateral leg medium and large and tortuous varicose  veins and spider veins  medial thigh right worse than the left  and calf and shin \par RLE  all incisions healing well \par LLE Varicose veins measuring 4-5  mm in size on the thigh/calf /shin \par no wounds/ulcers\par Physical exam and extremities exam findings via telehealth video review\par  [de-identified] : cooperative

## 2021-02-23 NOTE — HISTORY OF PRESENT ILLNESS
[FreeTextEntry1] : pt reports sadie le rt worse than left discomfort and throbbing and pain intensity 8/10 slightly worse than the last OV   due  1st pregnancy june 24 2016 pt plans to undergo c section pt is compliant w comp stockings  [de-identified] : Pt states new lle swelling and discomfort w activity \par pt states only moderate  discomfort since last ov \par pt is compliant w comp stockings \par pt states no le wounds

## 2021-02-23 NOTE — REASON FOR VISIT
[Follow-Up: _____] : a [unfilled] follow-up visit [Home] : at home, [unfilled] , at the time of the visit. [Medical Office: ([facility])___] : at the medical office located in  [Other:____] : [unfilled] [Verbal consent obtained from patient] : the patient, [unfilled] [FreeTextEntry1] : My legs bother me

## 2021-03-09 NOTE — H&P PST ADULT - SOURCE OF INFORMATION, PROFILE
[FreeTextEntry1] : CC: Hyperthyroidism\par This is a 51-year-old female with diabetes mellitus/RAHEEM, hyperlipidemia, morbid obesity, Hashimoto's thyroiditis, Graves' disease hyperthyroidism, COVID-19 infection, here for follow-up.\par Diabetes was diagnosed at the age of 49.  She is currently on metformin 1000 mg daily.  She occasionally takes Metformin 2000 mg daily.  She denies a history of gestational diabetes. She does not SMBG at home. \par Her last ophthalmology exam was in 2019 and she denies retinopathy.  She denies neuropathy.  She denies nephropathy.\par \par She was diagnosed with hyperthyroidism in 2008.  Thyroid uptake and scan on November 6, 2008 showed 24-hour uptake 48.4%, heterogeneous thyroid with areas of increased activity with adjacent areas of decreased activity suggestive of multinodular goiter.  She was treated with methimazole for approximately 1 year at that time.  She reports that she developed hyperthyroidism after experiencing extreme stress at work.\par Hyperthyroidism recurred in 2015 and she took methimazole for approximately 1 year at that time as well.  She reports that she lost both parents within 1 year which precipitated hyperthyroidism.\par She had COVID-19 infection in March 2020 and developed hyperthyroidism in June 2020.  She is now on methimazole 5 mg   Every other day.\par \par She is trying to follow a low carbohydrate diet.\par 
patient

## 2021-03-10 RX ORDER — SODIUM CHLORIDE 9 G/ML
0.9 INJECTION, SOLUTION INTRAVENOUS
Qty: 1 | Refills: 0 | Status: COMPLETED | COMMUNITY
Start: 2020-07-01 | End: 2021-03-10

## 2021-03-10 RX ORDER — LIDOCAINE HYDROCHLORIDE 10 MG/ML
1 INJECTION, SOLUTION INFILTRATION; PERINEURAL
Qty: 50 | Refills: 0 | Status: COMPLETED | COMMUNITY
Start: 2020-07-01 | End: 2021-03-10

## 2021-03-10 RX ORDER — SODIUM BICARBONATE 84 MG/ML
8.4 INJECTION, SOLUTION INTRAVENOUS
Qty: 5 | Refills: 0 | Status: COMPLETED | COMMUNITY
Start: 2020-07-01 | End: 2021-03-10

## 2021-03-17 RX ORDER — SODIUM BICARBONATE 84 MG/ML
8.4 INJECTION, SOLUTION INTRAVENOUS
Qty: 5 | Refills: 0 | Status: COMPLETED | COMMUNITY
Start: 2021-03-17 | End: 2021-03-24

## 2021-03-17 RX ORDER — LIDOCAINE HYDROCHLORIDE 10 MG/ML
1 INJECTION, SOLUTION INFILTRATION; PERINEURAL
Qty: 50 | Refills: 0 | Status: COMPLETED | COMMUNITY
Start: 2021-03-17 | End: 2021-03-24

## 2021-03-20 DIAGNOSIS — Z01.818 ENCOUNTER FOR OTHER PREPROCEDURAL EXAMINATION: ICD-10-CM

## 2021-03-21 ENCOUNTER — APPOINTMENT (OUTPATIENT)
Dept: DISASTER EMERGENCY | Facility: CLINIC | Age: 41
End: 2021-03-21

## 2021-03-22 LAB — SARS-COV-2 N GENE NPH QL NAA+PROBE: NOT DETECTED

## 2021-03-24 ENCOUNTER — APPOINTMENT (OUTPATIENT)
Dept: VASCULAR SURGERY | Facility: CLINIC | Age: 41
End: 2021-03-24
Payer: COMMERCIAL

## 2021-03-24 PROCEDURE — 99072 ADDL SUPL MATRL&STAF TM PHE: CPT

## 2021-03-24 PROCEDURE — 36475 ENDOVENOUS RF 1ST VEIN: CPT | Mod: LT

## 2021-03-24 NOTE — PROCEDURE
[FreeTextEntry1] : left GSV RFA [FreeTextEntry3] : Procedural safety checklist and time out completed:\par Confirmed patient identification (Patient Name, , and/or medical record number including when possible affirmation by patient or parent/family/other).\par Confirmed procedure with the patient. Consent present, accurate and signed. \par Confirmed special equipment and supplies are present.\par Sterility confirmed. Position verified. \par Site/ side is marked and visible and confirmed. \par Procedure confirmed by consent. Accurate consent including side and site.\par Review of medical records, including venous ultrasound, noting correct procedure including site and side.\par MD/PA verifies presence and review of imaging studies and or written report of imaging studies.\par Agreement on the procedure to be performed\par Time out completed.\par All of the above has been confirmed by the team.\par All patient-specific concerns have been addressed. \par \par Indication: Left lower extremity varicose veins with inflammation, leg pain, leg swelling, and leg cramping.  Venous insufficiency/ reflux.\par \par Procedure: radiofrequency ablation of the left great saphenous vein. \par 	\par Ms. CHRIS SANDOVAL is a 40 year old F with a history of left lower extremity varicose veins previously seen in the office.  Ultrasound examination demonstrated venous insufficiency. A trial of compression stockings, exercise, elevation, and pain medication was attempted without relief and definitive treatment with radiofrequency ablation was offered. \par \par The patient has come for radiofrequency ablation treatment of the left great saphenous vein. Pre-procedure COVID PCR test was negative.\par I have discussed the risks of the procedure at length with the patient. The risks discussed were inclusive of but not limited to infection, irritation at the site of infiltration of local anesthesia, and also rare risk of deep venous thrombosis and pulmonary emboli. The patient agrees to proceed with the procedure. \par The patient was escorted into the procedure room and a time out called.\par The entire limb was prepped and draped in sterile fashion. The RF fiber was placed on the sterile field and connected by a sterile cable. Actuation, temperature, and impedance testing were performed to ensure that all components were connected and operating properly. \par The patient was placed on the procedure table and local anesthesia was instilled in the skin overlying the access site. Under ultrasound guidance, the vein was punctured with a micropuncture needle, using the anterior wall technique. A guide wire was now introduced through the needle, and the needle was then exchanged over the guide wire for a 7F sheath. The guide wire was removed and the RF probe was then placed into the left great saphenous vein through the sheath and position confirmed using ultrasound guidance. After the RF probe position was verified by ultrasound, tumescent anesthesia consisting of normal saline, 1% lidocaine with 8.4% sodium bicarbonate was infiltrated, under ultrasound guidance, precisely into the perivenous compartment along the entire length of the vein until a “halo” of fluid was noted around the vein. After RF probe position was again confirmed with ultrasound imaging, RF energy was applied. The probe was gradually and carefully withdrawn at a rate of 6.5cm/20seconds. \par \par 3 cycles of RF performed using the 3.7 cm probe\par Total treatment time was 60 seconds.\par The total volume injected was 300  cc\par Treatment length was 13.5  cm and \par The probe is 3.7  cm from the SFJ.\par \par Estimated Blood Loss: minimal\par Repeat ultrasound of the treated vein was performed confirming successful treatment. The catheter and sheath were withdrawn and hemostasis established with direct pressure. After assuring hemostasis, a sterile 4x4 was placed on the access site and an ACE compression wrap was applied. Patient tolerated procedure well. Patient was given post-procedure instructions and follow up appointment was scheduled.\par \par \par

## 2021-03-29 ENCOUNTER — APPOINTMENT (OUTPATIENT)
Dept: VASCULAR SURGERY | Facility: CLINIC | Age: 41
End: 2021-03-29
Payer: COMMERCIAL

## 2021-03-29 PROCEDURE — 99072 ADDL SUPL MATRL&STAF TM PHE: CPT

## 2021-03-29 PROCEDURE — 93971 EXTREMITY STUDY: CPT

## 2021-04-21 ENCOUNTER — APPOINTMENT (OUTPATIENT)
Dept: VASCULAR SURGERY | Facility: CLINIC | Age: 41
End: 2021-04-21
Payer: COMMERCIAL

## 2021-04-21 PROCEDURE — 99441: CPT

## 2021-04-21 NOTE — HISTORY OF PRESENT ILLNESS
[FreeTextEntry1] : pt reports sadie le rt worse than left discomfort and throbbing and pain intensity 8/10 slightly worse than the last OV   due  1st pregnancy june 24 2016 pt plans to undergo c section pt is compliant w comp stockings  [de-identified] : Pt states less leg swelling and discomfort w activity \par pt states overall she is feeling better\par pt is compliant w comp stockings \par pt states no le wounds

## 2021-04-21 NOTE — ASSESSMENT
[Arterial/Venous Disease] : arterial/venous disease [Other: _____] : [unfilled] [Foot care/Footwear] : foot care/footwear [FreeTextEntry1] : Impression venous insuff w sx  w clinical improvement \par \par \par Med Conserv management leg elevation, thigh high comp stockings 20-30mm Hg, wt loss, diet control\par telehealth visit in 6mo oct 2021 to re eval \par Telephonic visit  time duration 9 min\par \par \par \par \par Varicose veins are enlarged, twisted veins. Varicose veins are caused by increased blood pressure in the veins.  The blood moves towards the heart by 1-way valves in the veins. When the valves become weakened or damaged, blood can collect in the veins and pool in your lower legs (ankles). This causes the veins to become enlarged and incompetent with reflux. Sitting or standing for long periods can cause blood to pool in the leg veins, increasing the pressure within the veins. \par Risk factors for varicose veins or venous disease may include:  obesity, older age, standing or sitting for prolonged periods of time for several years, being female, pregnancy, taking oral contraceptive pills or hormone replacement, being inactive, and/or smoking. \par The most common symptoms of varicose veins are sensations in the legs, such as a heavy feeling, burning, and/or aching. However, each individual may experience symptoms differently.  Other symptoms may include:  color changes in the skin, sores on the legs, or rash.  Severe varicose veins or venous disease may eventually produce long-term mild swelling that can result in more serious skin and tissue problems, such as ulcers and non-healing sores.\par Varicose veins and venous disease are diagnosed by a complete medical history, physical examination, and diagnostic studies for varicose veins including duplex ultrasound and color-flow imaging.  \par Medical treatment for varicose veins and venous disease include:  compression stockings, sclerotherapy, endovenous ablation and/or surgical treatment with microphlebectomy.  \par \par

## 2021-04-21 NOTE — REASON FOR VISIT
[Follow-Up: _____] : a [unfilled] follow-up visit [Home] : at home, [unfilled] , at the time of the visit. [Medical Office: ([facility])___] : at the medical office located in  [Other:____] : [unfilled] [Verbal consent obtained from patient] : the patient, [unfilled] [FreeTextEntry1] : My legs bother me less

## 2021-04-21 NOTE — PHYSICAL EXAM
[Alert] : alert [Oriented to Person] : oriented to person [Oriented to Place] : oriented to place [Oriented to Time] : oriented to time [Calm] : calm [FreeTextEntry1] : Physical exam findings via telephonic review with patient \par \par \par Multiple  bilateral leg medium and large and tortuous varicose  veins and spider veins  medial thigh right worse than the left  and calf and shin \par RLE  all incisions healing well \par LLE Varicose veins measuring 4-5  mm in size on the thigh/calf /shin \par no wounds/ulcers\par Physical exam and extremities exam findings via telehealth video review\par  [de-identified] : no resp distress [de-identified] : cooperative

## 2021-04-25 ENCOUNTER — APPOINTMENT (OUTPATIENT)
Dept: DISASTER EMERGENCY | Facility: CLINIC | Age: 41
End: 2021-04-25

## 2021-06-08 NOTE — OB RN DELIVERY SUMMARY - [facility]_OBGYN_ALL_OB
· Schedule follow-up appointment with Zahira Cantu CNP in 3 months   · continue present medication and dosing schedule and make a to-do list/chart     
Was not done

## 2021-07-06 ENCOUNTER — RESULT REVIEW (OUTPATIENT)
Age: 41
End: 2021-07-06

## 2021-10-20 ENCOUNTER — APPOINTMENT (OUTPATIENT)
Dept: VASCULAR SURGERY | Facility: CLINIC | Age: 41
End: 2021-10-20
Payer: COMMERCIAL

## 2021-10-20 PROCEDURE — 99441: CPT

## 2021-10-20 NOTE — HISTORY OF PRESENT ILLNESS
[FreeTextEntry1] : pt reports sadie le rt worse than left discomfort and throbbing and pain intensity 8/10 slightly worse than the last OV   due  1st pregnancy june 24 2016 pt plans to undergo c section pt is compliant w comp stockings  [de-identified] : Pt states le c/o have  nearly resolved \par pt states overall she is feeling better\par pt is compliant w comp stockings \par pt states no le wounds

## 2021-10-20 NOTE — REASON FOR VISIT
[Follow-Up: _____] : a [unfilled] follow-up visit [FreeTextEntry1] : My legs bother me less [Home] : at home, [unfilled] , at the time of the visit. [Medical Office: ([facility])___] : at the medical office located in  [Other:____] : [unfilled] [Verbal consent obtained from patient] : the patient, [unfilled]

## 2021-10-20 NOTE — ASSESSMENT
[FreeTextEntry1] : Impression venous insuff w sx  w clinical improvement \par \par \par Med Conserv management leg elevation, thigh high comp stockings 20-30mm Hg, wt loss, diet control\par telehealth visit in 12mo oct 2022 to re eval \par rto prn \par Telephonic visit  time duration 6 min\par \par \par \par \par Varicose veins are enlarged, twisted veins. Varicose veins are caused by increased blood pressure in the veins.  The blood moves towards the heart by 1-way valves in the veins. When the valves become weakened or damaged, blood can collect in the veins and pool in your lower legs (ankles). This causes the veins to become enlarged and incompetent with reflux. Sitting or standing for long periods can cause blood to pool in the leg veins, increasing the pressure within the veins. \par Risk factors for varicose veins or venous disease may include:  obesity, older age, standing or sitting for prolonged periods of time for several years, being female, pregnancy, taking oral contraceptive pills or hormone replacement, being inactive, and/or smoking. \par The most common symptoms of varicose veins are sensations in the legs, such as a heavy feeling, burning, and/or aching. However, each individual may experience symptoms differently.  Other symptoms may include:  color changes in the skin, sores on the legs, or rash.  Severe varicose veins or venous disease may eventually produce long-term mild swelling that can result in more serious skin and tissue problems, such as ulcers and non-healing sores.\par Varicose veins and venous disease are diagnosed by a complete medical history, physical examination, and diagnostic studies for varicose veins including duplex ultrasound and color-flow imaging.  \par Medical treatment for varicose veins and venous disease include:  compression stockings, sclerotherapy, endovenous ablation and/or surgical treatment with microphlebectomy.  \par \par  [Arterial/Venous Disease] : arterial/venous disease [Other: _____] : [unfilled] [Foot care/Footwear] : foot care/footwear

## 2021-10-20 NOTE — PHYSICAL EXAM
[Alert] : alert [Oriented to Person] : oriented to person [Oriented to Place] : oriented to place [Oriented to Time] : oriented to time [Calm] : calm [de-identified] : no resp distress [FreeTextEntry1] : Physical exam findings via telephonic review with patient \par \par \par Multiple  bilateral leg medium and large and tortuous varicose  veins and spider veins  medial thigh right worse than the left  and calf and shin \par RLE  all incisions healing well \par LLE Varicose veins measuring 4-5  mm in size on the thigh/calf /shin \par no wounds/ulcers\par Physical exam and extremities exam findings via telehealth video review\par  [de-identified] : cooperative

## 2021-11-19 NOTE — H&P PST ADULT - NSICDXPASTMEDICALHX_GEN_ALL_CORE_FT
PAST MEDICAL HISTORY:  Antiphospholipid antibody syndrome last hematologist evaluaton (Dr. Denton) in 10/2019    Decreased sensation of lower extremity R/T  Venous insufficiency last vascular evaluation Dr. Amaral 2016    Hypothyroidism last endo evaluation Dr. RIOS in second trimester    Uterine fibroid     Uterine polyp     Venous insufficiency (chronic) (peripheral)     
Breath sounds clear and equal bilaterally.

## 2022-07-01 RX ORDER — LEVOTHYROXINE SODIUM 125 MCG
1 TABLET ORAL
Qty: 0 | Refills: 0 | DISCHARGE

## 2022-07-01 RX ORDER — ASCORBIC ACID 60 MG
1 TABLET,CHEWABLE ORAL
Qty: 0 | Refills: 0 | DISCHARGE

## 2022-07-01 RX ORDER — ASPIRIN/CALCIUM CARB/MAGNESIUM 324 MG
1 TABLET ORAL
Qty: 0 | Refills: 0 | DISCHARGE

## 2022-07-01 RX ORDER — PSYLLIUM SEED (WITH DEXTROSE)
1 POWDER (GRAM) ORAL
Qty: 0 | Refills: 0 | DISCHARGE

## 2022-07-01 RX ORDER — METHYLPREDNISOLONE 4 MG
1 TABLET ORAL
Qty: 0 | Refills: 0 | DISCHARGE

## 2022-07-01 RX ORDER — ENOXAPARIN SODIUM 100 MG/ML
40 INJECTION SUBCUTANEOUS
Qty: 0 | Refills: 0 | DISCHARGE

## 2022-07-01 RX ORDER — PROGESTERONE 200 MG/1
0 CAPSULE, LIQUID FILLED ORAL
Qty: 0 | Refills: 0 | DISCHARGE

## 2022-07-01 RX ORDER — ENOXAPARIN SODIUM 100 MG/ML
0 INJECTION SUBCUTANEOUS
Qty: 0 | Refills: 0 | DISCHARGE

## 2022-07-01 RX ORDER — UBIDECARENONE 100 MG
200 CAPSULE ORAL
Qty: 0 | Refills: 0 | DISCHARGE

## 2022-07-01 RX ORDER — HEPARIN SODIUM 5000 [USP'U]/ML
5000 INJECTION INTRAVENOUS; SUBCUTANEOUS
Qty: 0 | Refills: 0 | DISCHARGE

## 2022-09-28 ENCOUNTER — RESULT REVIEW (OUTPATIENT)
Age: 42
End: 2022-09-28

## 2022-09-28 ENCOUNTER — APPOINTMENT (OUTPATIENT)
Dept: OBGYN | Facility: CLINIC | Age: 42
End: 2022-09-28

## 2022-09-28 PROCEDURE — 99396 PREV VISIT EST AGE 40-64: CPT | Mod: 25

## 2022-09-28 PROCEDURE — 76830 TRANSVAGINAL US NON-OB: CPT

## 2022-09-28 PROCEDURE — 76856 US EXAM PELVIC COMPLETE: CPT | Mod: 59

## 2022-09-28 PROCEDURE — 81002 URINALYSIS NONAUTO W/O SCOPE: CPT

## 2022-10-19 ENCOUNTER — APPOINTMENT (OUTPATIENT)
Dept: VASCULAR SURGERY | Facility: CLINIC | Age: 42
End: 2022-10-19

## 2022-10-19 PROCEDURE — 99442: CPT

## 2022-10-19 NOTE — HISTORY OF PRESENT ILLNESS
[FreeTextEntry1] : pt reports sadie le rt worse than left discomfort and throbbing and pain intensity 8/10 slightly worse than the last OV   due  1st pregnancy june 24 2016 pt plans to undergo c section pt is compliant w comp stockings  [de-identified] : Pt states less leg swelling and discomfort w activity \par pt states overall she is feeling better\par pt is compliant w comp stockings \par pt states no le wounds \par pt states  in the past sev weeks left  medial distal thigh  some numbness "funny feeling" developed \par pt denies pain or inj

## 2022-10-19 NOTE — ASSESSMENT
[FreeTextEntry1] : Impression venous insuff w sx  w clinical improvement \par \par \par [facility] management leg elevation, thigh high comp stockings 20-30mm Hg, wt loss, diet control\par low s/o lle dvt   but d/w pt is  c/o persist then  advised pt to call and rto for lle venous duplex s/o dvt\par ov 6-7mo to re eval \par Telephonic visit  time duration 12 min\par \par \par \par \par Varicose veins are enlarged, twisted veins. Varicose veins are caused by increased blood pressure in the veins.  The blood moves towards the heart by 1-way valves in the veins. When the valves become weakened or damaged, blood can collect in the veins and pool in your lower legs (ankles). This causes the veins to become enlarged and incompetent with reflux. Sitting or standing for long periods can cause blood to pool in the leg veins, increasing the pressure within the veins. \par Risk factors for varicose veins or venous disease may include:  obesity, older age, standing or sitting for prolonged periods of time for several years, being female, pregnancy, taking oral contraceptive pills or hormone replacement, being inactive, and/or smoking. \par The most common symptoms of varicose veins are sensations in the legs, such as a heavy feeling, burning, and/or aching. However, each individual may experience symptoms differently.  Other symptoms may include:  color changes in the skin, sores on the legs, or rash.  Severe varicose veins or venous disease may eventually produce long-term mild swelling that can result in more serious skin and tissue problems, such as ulcers and non-healing sores.\par Varicose veins and venous disease are diagnosed by a complete medical history, physical examination, and diagnostic studies for varicose veins including duplex ultrasound and color-flow imaging.  \par Medical treatment for varicose veins and venous disease include:  compression stockings, sclerotherapy, endovenous ablation and/or surgical treatment with microphlebectomy.  \par \par  [Arterial/Venous Disease] : arterial/venous disease [Other: _____] : [unfilled] [Foot care/Footwear] : foot care/footwear

## 2022-10-19 NOTE — REASON FOR VISIT
[Follow-Up: _____] : a [unfilled] follow-up visit [FreeTextEntry1] : My legs bother me less [Home] : at home, [unfilled] , at the time of the visit. [Medical Office: ([facility])___] : at the medical office located in  [Other:____] : [unfilled] [Verbal consent obtained from patient] : the patient, [unfilled]

## 2022-10-19 NOTE — PHYSICAL EXAM
[Alert] : alert [Oriented to Person] : oriented to person [Oriented to Place] : oriented to place [Oriented to Time] : oriented to time [Calm] : calm [de-identified] : no resp distress [FreeTextEntry1] : Physical exam findings via telephonic review with patient \par \par \par Multiple  bilateral leg medium and large and tortuous varicose  veins and spider veins  medial thigh right worse than the left  and calf and shin \par RLE  all incisions healing well \par LLE Varicose veins measuring 4-5  mm in size on the thigh/calf /shin \par no wounds/ulcers\par Physical exam and extremities exam findings via telehealth video review\par  [de-identified] : cooperative

## 2022-10-27 ENCOUNTER — NON-APPOINTMENT (OUTPATIENT)
Age: 42
End: 2022-10-27

## 2023-04-18 ENCOUNTER — APPOINTMENT (OUTPATIENT)
Dept: VASCULAR SURGERY | Facility: CLINIC | Age: 43
End: 2023-04-18
Payer: COMMERCIAL

## 2023-04-18 VITALS
SYSTOLIC BLOOD PRESSURE: 111 MMHG | BODY MASS INDEX: 25.66 KG/M2 | HEART RATE: 81 BPM | WEIGHT: 154 LBS | HEIGHT: 65 IN | TEMPERATURE: 97.6 F | DIASTOLIC BLOOD PRESSURE: 79 MMHG

## 2023-04-18 DIAGNOSIS — I87.2 VENOUS INSUFFICIENCY (CHRONIC) (PERIPHERAL): ICD-10-CM

## 2023-04-18 PROCEDURE — 93970 EXTREMITY STUDY: CPT

## 2023-04-18 PROCEDURE — 99214 OFFICE O/P EST MOD 30 MIN: CPT

## 2023-04-18 NOTE — DATA REVIEWED
[FreeTextEntry1] : 5/19/2015 Venous doppler dexter le no dvt svt  sig for rt gsv insuff prox thigh to knee\par \par 2/2/2016  Venous duplex  dexter le no dvt svt \par \par 5/6/2016 RLE Venous Duplex  no dvt svt  sig GSV insuff \par \par 6/9/2020 Venous Doppler Dexter LE  no acute dvt svt \par                            RLE GSV insuff from sfj to knee level  w insuff knee area  collaterals\par                            LLE LSV insuff from spj to distal calf level \par \par 2/23/2021 Venous Doppler Dexter LE  no acute dvt svt \par                            RLE LSV competent , GSV not viz\par                            LLE GSV insuff from sfj to knee level  \par \par \par 4/18/2023 Venous Doppler Dexter LE  no acute dvt svt \par                            RLE  no sono evidence of  insuff \par                            LLE GSV insuff limited segment  from distal thigh to  below the knee  \par \par \par

## 2023-04-18 NOTE — PHYSICAL EXAM
[Alert] : alert [Oriented to Person] : oriented to person [Oriented to Place] : oriented to place [Oriented to Time] : oriented to time [Calm] : calm [JVD] : no jugular venous distention  [1+] : left 1+ [2+] : left 2+ [Ankle Swelling (On Exam)] : present [Ankle Swelling Bilaterally] : bilaterally  [Varicose Veins Of Lower Extremities] : bilaterally [] : bilaterally [Ankle Swelling On The Right] : mild [de-identified] : nad [de-identified] : wnl [FreeTextEntry1] : Moderate bilateral leg venous insufficiency \par w mild bilateral leg stasis dermatitis\par and mild bilateral leg edema \par Multiple  bilateral leg small varicose  veins and spider veins  ant post medial calf and shin \par RLE Varicose veins measuring  1-3 mm in size on the calf /shin \par LLE Varicose veins measuring  1-3 mm in size on the calf /shin \par no wounds/ulcers\par  [de-identified] : wnl [de-identified] : Dexter Cranial nerves 2-12 dexter grossly intact [de-identified] : cooperative

## 2023-04-18 NOTE — HISTORY OF PRESENT ILLNESS
[FreeTextEntry1] : pt reports sadie le rt worse than left discomfort and throbbing and pain intensity 8/10 slightly worse than the last OV   due  1st pregnancy june 24 2016 pt plans to undergo c section pt is compliant w comp stockings  [de-identified] : Pt states less leg swelling and discomfort w activity \par pt is compliant w comp stockings \par

## 2023-04-18 NOTE — ASSESSMENT
[Arterial/Venous Disease] : arterial/venous disease [Other: _____] : [unfilled] [Foot care/Footwear] : foot care/footwear [FreeTextEntry1] : Impression venous insuff w sx \par \par \par [facility] [employer] management leg elevation, thigh high comp stockings 20-30mm Hg, wt loss, diet control\par d/w pt sadie le sclero indications risks and benefits\par pt wants to hold off and be re eval \par ov w vwenous doppler s/o venous insuff 12mo april 2024 \par \par \par \par \par \par Varicose veins are enlarged, twisted veins. Varicose veins are caused by increased blood pressure in the veins.  The blood moves towards the heart by 1-way valves in the veins. When the valves become weakened or damaged, blood can collect in the veins and pool in your lower legs (ankles). This causes the veins to become enlarged and incompetent with reflux. Sitting or standing for long periods can cause blood to pool in the leg veins, increasing the pressure within the veins. \par Risk factors for varicose veins or venous disease may include:  obesity, older age, standing or sitting for prolonged periods of time for several years, being female, pregnancy, taking oral contraceptive pills or hormone replacement, being inactive, and/or smoking. \par The most common symptoms of varicose veins are sensations in the legs, such as a heavy feeling, burning, and/or aching. However, each individual may experience symptoms differently.  Other symptoms may include:  color changes in the skin, sores on the legs, or rash.  Severe varicose veins or venous disease may eventually produce long-term mild swelling that can result in more serious skin and tissue problems, such as ulcers and non-healing sores.\par Varicose veins and venous disease are diagnosed by a complete medical history, physical examination, and diagnostic studies for varicose veins including duplex ultrasound and color-flow imaging.  \par Medical treatment for varicose veins and venous disease include:  compression stockings, sclerotherapy, endovenous ablation and/or surgical treatment with microphlebectomy.  \par \par

## 2023-05-30 ENCOUNTER — APPOINTMENT (OUTPATIENT)
Dept: OBGYN | Facility: CLINIC | Age: 43
End: 2023-05-30
Payer: COMMERCIAL

## 2023-05-30 PROCEDURE — 76830 TRANSVAGINAL US NON-OB: CPT

## 2023-05-30 PROCEDURE — 36415 COLL VENOUS BLD VENIPUNCTURE: CPT

## 2023-05-30 PROCEDURE — 99213 OFFICE O/P EST LOW 20 MIN: CPT | Mod: 25

## 2023-12-14 ENCOUNTER — APPOINTMENT (OUTPATIENT)
Dept: OBGYN | Facility: CLINIC | Age: 43
End: 2023-12-14
Payer: COMMERCIAL

## 2023-12-14 PROCEDURE — 99396 PREV VISIT EST AGE 40-64: CPT | Mod: 25

## 2023-12-14 PROCEDURE — 81002 URINALYSIS NONAUTO W/O SCOPE: CPT

## 2023-12-14 PROCEDURE — 76830 TRANSVAGINAL US NON-OB: CPT

## 2023-12-14 PROCEDURE — 36415 COLL VENOUS BLD VENIPUNCTURE: CPT

## 2024-01-11 ENCOUNTER — APPOINTMENT (OUTPATIENT)
Dept: OBGYN | Facility: CLINIC | Age: 44
End: 2024-01-11
Payer: COMMERCIAL

## 2024-01-11 PROCEDURE — 76830 TRANSVAGINAL US NON-OB: CPT

## 2024-01-11 PROCEDURE — 81025 URINE PREGNANCY TEST: CPT

## 2024-01-11 PROCEDURE — 58100 BIOPSY OF UTERUS LINING: CPT

## 2024-01-18 ENCOUNTER — APPOINTMENT (OUTPATIENT)
Dept: OBGYN | Facility: CLINIC | Age: 44
End: 2024-01-18
Payer: COMMERCIAL

## 2024-01-18 PROCEDURE — 81025 URINE PREGNANCY TEST: CPT

## 2024-01-18 PROCEDURE — 58563Z: CUSTOM

## 2024-02-01 ENCOUNTER — APPOINTMENT (OUTPATIENT)
Dept: OBGYN | Facility: CLINIC | Age: 44
End: 2024-02-01
Payer: COMMERCIAL

## 2024-02-01 PROCEDURE — 99213 OFFICE O/P EST LOW 20 MIN: CPT | Mod: 25

## 2024-02-01 PROCEDURE — 76830 TRANSVAGINAL US NON-OB: CPT

## 2024-04-23 ENCOUNTER — APPOINTMENT (OUTPATIENT)
Dept: VASCULAR SURGERY | Facility: CLINIC | Age: 44
End: 2024-04-23
Payer: COMMERCIAL

## 2024-04-23 VITALS
WEIGHT: 160 LBS | DIASTOLIC BLOOD PRESSURE: 78 MMHG | BODY MASS INDEX: 26.66 KG/M2 | HEART RATE: 55 BPM | HEIGHT: 65 IN | TEMPERATURE: 99.4 F | SYSTOLIC BLOOD PRESSURE: 111 MMHG

## 2024-04-23 DIAGNOSIS — I83.893 VARICOSE VEINS OF BILATERAL LOWER EXTREMITIES WITH OTHER COMPLICATIONS: ICD-10-CM

## 2024-04-23 PROCEDURE — ZZZZZ: CPT

## 2024-04-23 PROCEDURE — 93970 EXTREMITY STUDY: CPT

## 2024-04-23 PROCEDURE — 99214 OFFICE O/P EST MOD 30 MIN: CPT

## 2024-04-23 RX ORDER — THIAMINE HCL 100 MG
500 TABLET ORAL
Refills: 0 | Status: ACTIVE | COMMUNITY

## 2024-04-23 RX ORDER — FLUOXETINE HYDROCHLORIDE 10 MG/1
10 CAPSULE ORAL
Refills: 0 | Status: ACTIVE | COMMUNITY

## 2024-04-23 NOTE — DATA REVIEWED
[FreeTextEntry1] : 5/19/2015 Venous doppler dexter le no dvt svt  sig for rt gsv insuff prox thigh to knee  2/2/2016  Venous duplex  dexter le no dvt svt   5/6/2016 RLE Venous Duplex  no dvt svt  sig GSV insuff   6/9/2020 Venous Doppler Dexter LE  no acute dvt svt                             RLE GSV insuff from sfj to knee level  w insuff knee area  collaterals                            LLE LSV insuff from spj to distal calf level   2/23/2021 Venous Doppler Dexter LE  no acute dvt svt                             RLE LSV competent , GSV not viz                            LLE GSV insuff from sfj to knee level     4/18/2023 Venous Doppler Dexter LE  no acute dvt svt                             RLE  no sono evidence of  insuff                             LLE GSV insuff limited segment  from distal thigh to  below the knee    4/23/2024  Venous Doppler Dexter LE  no acute dvt svt                             RLE  no sono evidence of  insuff                             LLE GSV insuff limited segment  from sfj to proximal  thigh

## 2024-04-23 NOTE — HISTORY OF PRESENT ILLNESS
[FreeTextEntry1] : pt reports sadie le rt worse than left discomfort and throbbing and pain intensity 8/10 slightly worse than the last OV   due  1st pregnancy june 24 2016 pt plans to undergo c section pt is compliant w comp stockings  [de-identified] : Pt states ongoing  leg swelling and discomfort w activity  pt is compliant w comp stockings

## 2024-04-23 NOTE — ASSESSMENT
[Arterial/Venous Disease] : arterial/venous disease [Other: _____] : [unfilled] [FreeTextEntry1] : Impression venous insuff w sx    [facility] management leg elevation, thigh high comp stockings 20-30mm Hg, wt loss, diet control d/w pt sadie le sclero indications risks and benefits pt wants to hold off and be re eval  ov w venous doppler s/o venous insuff 6mo oct 2024       Varicose veins are enlarged, twisted veins. Varicose veins are caused by increased blood pressure in the veins.  The blood moves towards the heart by 1-way valves in the veins. When the valves become weakened or damaged, blood can collect in the veins and pool in your lower legs (ankles). This causes the veins to become enlarged and incompetent with reflux. Sitting or standing for long periods can cause blood to pool in the leg veins, increasing the pressure within the veins.  Risk factors for varicose veins or venous disease may include:  obesity, older age, standing or sitting for prolonged periods of time for several years, being female, pregnancy, taking oral contraceptive pills or hormone replacement, being inactive, and/or smoking.  The most common symptoms of varicose veins are sensations in the legs, such as a heavy feeling, burning, and/or aching. However, each individual may experience symptoms differently.  Other symptoms may include:  color changes in the skin, sores on the legs, or rash.  Severe varicose veins or venous disease may eventually produce long-term mild swelling that can result in more serious skin and tissue problems, such as ulcers and non-healing sores. Varicose veins and venous disease are diagnosed by a complete medical history, physical examination, and diagnostic studies for varicose veins including duplex ultrasound and color-flow imaging.   Medical treatment for varicose veins and venous disease include:  compression stockings, sclerotherapy, endovenous ablation and/or surgical treatment with microphlebectomy.

## 2024-04-23 NOTE — PHYSICAL EXAM
[1+] : left 1+ [2+] : left 2+ [Ankle Swelling (On Exam)] : present [Ankle Swelling Bilaterally] : bilaterally  [Varicose Veins Of Lower Extremities] : bilaterally [] : bilaterally [Ankle Swelling On The Right] : mild [Alert] : alert [Oriented to Person] : oriented to person [Oriented to Place] : oriented to place [Oriented to Time] : oriented to time [Calm] : calm [JVD] : no jugular venous distention  [de-identified] : nad [de-identified] : wnl [de-identified] : no resp distress [FreeTextEntry1] : Moderate bilateral leg venous insufficiency  w mild bilateral leg stasis dermatitis and mild bilateral leg edema  Multiple  bilateral leg small varicose  veins and spider veins  ant post medial calf and shin  RLE Varicose veins measuring  1-3 mm in size on the calf /shin  LLE Varicose veins measuring  1-3 mm in size on the calf /shin  no wounds/ulcers  [de-identified] : wnl [de-identified] : Dexter Cranial nerves 2-12 dexter grossly intact [de-identified] : cooperative

## 2024-06-10 NOTE — OB RN PREOPERATIVE CHECKLIST - NS_PREOPVERIFPROC_OBGYN_ALL_OB
Tye Bravodo Barcenas discharged to home accompanied by family member.   Patient provided with the following educational materials upon discharge:  see AVS.   Valuables and belongings sent with patient.   discharge summary, discharge instructions, medications, and follow up appointments reviewed with patient and family member.  Patient and family member verbalized understanding. Writer used  for conversation. Writer walked patient and family members down to outpatient pharmacy.    done

## 2024-06-20 ENCOUNTER — APPOINTMENT (OUTPATIENT)
Dept: OBGYN | Facility: CLINIC | Age: 44
End: 2024-06-20
Payer: COMMERCIAL

## 2024-06-20 PROCEDURE — 76830 TRANSVAGINAL US NON-OB: CPT

## 2024-06-20 PROCEDURE — 99213 OFFICE O/P EST LOW 20 MIN: CPT | Mod: 25

## 2024-12-09 ENCOUNTER — APPOINTMENT (OUTPATIENT)
Dept: VASCULAR SURGERY | Facility: CLINIC | Age: 44
End: 2024-12-09
Payer: COMMERCIAL

## 2024-12-09 VITALS
BODY MASS INDEX: 25.83 KG/M2 | DIASTOLIC BLOOD PRESSURE: 86 MMHG | WEIGHT: 155 LBS | HEIGHT: 65 IN | TEMPERATURE: 98.3 F | SYSTOLIC BLOOD PRESSURE: 125 MMHG | HEART RATE: 72 BPM

## 2024-12-09 DIAGNOSIS — I83.893 VARICOSE VEINS OF BILATERAL LOWER EXTREMITIES WITH OTHER COMPLICATIONS: ICD-10-CM

## 2024-12-09 PROCEDURE — 93970 EXTREMITY STUDY: CPT

## 2024-12-09 PROCEDURE — 99214 OFFICE O/P EST MOD 30 MIN: CPT

## 2024-12-09 PROCEDURE — ZZZZZ: CPT

## 2025-01-15 ENCOUNTER — APPOINTMENT (OUTPATIENT)
Dept: VASCULAR SURGERY | Facility: CLINIC | Age: 45
End: 2025-01-15
Payer: SELF-PAY

## 2025-01-15 PROCEDURE — 36468 NJX SCLRSNT SPIDER VEINS: CPT | Mod: 50

## 2025-02-18 ENCOUNTER — APPOINTMENT (OUTPATIENT)
Dept: OBGYN | Facility: CLINIC | Age: 45
End: 2025-02-18
Payer: COMMERCIAL

## 2025-02-18 ENCOUNTER — APPOINTMENT (OUTPATIENT)
Dept: VASCULAR SURGERY | Facility: CLINIC | Age: 45
End: 2025-02-18
Payer: COMMERCIAL

## 2025-02-18 VITALS
WEIGHT: 155 LBS | HEIGHT: 65 IN | BODY MASS INDEX: 25.83 KG/M2 | HEART RATE: 63 BPM | TEMPERATURE: 98.2 F | SYSTOLIC BLOOD PRESSURE: 118 MMHG | DIASTOLIC BLOOD PRESSURE: 80 MMHG

## 2025-02-18 DIAGNOSIS — I87.2 VENOUS INSUFFICIENCY (CHRONIC) (PERIPHERAL): ICD-10-CM

## 2025-02-18 DIAGNOSIS — I83.893 VARICOSE VEINS OF BILATERAL LOWER EXTREMITIES WITH OTHER COMPLICATIONS: ICD-10-CM

## 2025-02-18 DIAGNOSIS — I78.1 NEVUS, NON-NEOPLASTIC: ICD-10-CM

## 2025-02-18 PROCEDURE — 99213 OFFICE O/P EST LOW 20 MIN: CPT

## 2025-02-18 PROCEDURE — 99396 PREV VISIT EST AGE 40-64: CPT | Mod: 25

## 2025-02-18 PROCEDURE — 81002 URINALYSIS NONAUTO W/O SCOPE: CPT

## 2025-02-18 PROCEDURE — 99459 PELVIC EXAMINATION: CPT

## 2025-02-18 PROCEDURE — 82270 OCCULT BLOOD FECES: CPT

## 2025-02-18 PROCEDURE — 76856 US EXAM PELVIC COMPLETE: CPT | Mod: 59

## 2025-02-18 PROCEDURE — 76830 TRANSVAGINAL US NON-OB: CPT

## 2025-03-19 ENCOUNTER — APPOINTMENT (OUTPATIENT)
Dept: VASCULAR SURGERY | Facility: CLINIC | Age: 45
End: 2025-03-19
Payer: SELF-PAY

## 2025-03-19 PROCEDURE — 36468 NJX SCLRSNT SPIDER VEINS: CPT | Mod: 50

## 2025-05-21 ENCOUNTER — APPOINTMENT (OUTPATIENT)
Dept: VASCULAR SURGERY | Facility: CLINIC | Age: 45
End: 2025-05-21

## 2025-06-03 ENCOUNTER — APPOINTMENT (OUTPATIENT)
Dept: VASCULAR SURGERY | Facility: CLINIC | Age: 45
End: 2025-06-03